# Patient Record
Sex: MALE | Race: WHITE | NOT HISPANIC OR LATINO | Employment: FULL TIME | URBAN - METROPOLITAN AREA
[De-identification: names, ages, dates, MRNs, and addresses within clinical notes are randomized per-mention and may not be internally consistent; named-entity substitution may affect disease eponyms.]

---

## 2020-03-01 ENCOUNTER — HOSPITAL ENCOUNTER (EMERGENCY)
Facility: HOSPITAL | Age: 60
Discharge: HOME | End: 2020-03-01
Attending: EMERGENCY MEDICINE
Payer: COMMERCIAL

## 2020-03-01 VITALS
OXYGEN SATURATION: 98 % | WEIGHT: 230 LBS | SYSTOLIC BLOOD PRESSURE: 178 MMHG | TEMPERATURE: 99 F | HEART RATE: 82 BPM | HEIGHT: 72 IN | RESPIRATION RATE: 20 BRPM | BODY MASS INDEX: 31.15 KG/M2 | DIASTOLIC BLOOD PRESSURE: 89 MMHG

## 2020-03-01 DIAGNOSIS — S00.83XA CONTUSION OF FOREHEAD, INITIAL ENCOUNTER: ICD-10-CM

## 2020-03-01 DIAGNOSIS — S29.8XXA BLUNT CHEST TRAUMA: ICD-10-CM

## 2020-03-01 DIAGNOSIS — S22.31XA CLOSED FRACTURE OF ONE RIB OF RIGHT SIDE, INITIAL ENCOUNTER: Primary | ICD-10-CM

## 2020-03-01 DIAGNOSIS — S52.126A CLOSED NONDISPLACED FRACTURE OF HEAD OF RADIUS, UNSPECIFIED LATERALITY, INITIAL ENCOUNTER: ICD-10-CM

## 2020-03-01 PROCEDURE — 29125 APPL SHORT ARM SPLINT STATIC: CPT | Mod: 50

## 2020-03-01 PROCEDURE — 25000003 PHARM REV CODE 250: Performed by: EMERGENCY MEDICINE

## 2020-03-01 PROCEDURE — 99285 EMERGENCY DEPT VISIT HI MDM: CPT | Mod: 25

## 2020-03-01 RX ORDER — HYDROCODONE BITARTRATE AND ACETAMINOPHEN 5; 325 MG/1; MG/1
1 TABLET ORAL
Status: COMPLETED | OUTPATIENT
Start: 2020-03-01 | End: 2020-03-01

## 2020-03-01 RX ORDER — HYDROCODONE BITARTRATE AND ACETAMINOPHEN 5; 325 MG/1; MG/1
1 TABLET ORAL EVERY 4 HOURS PRN
Qty: 20 TABLET | Refills: 0 | Status: SHIPPED | OUTPATIENT
Start: 2020-03-01 | End: 2020-03-03 | Stop reason: SDUPTHER

## 2020-03-01 RX ADMIN — HYDROCODONE BITARTRATE AND ACETAMINOPHEN 1 TABLET: 5; 325 TABLET ORAL at 10:03

## 2020-03-01 NOTE — ED NOTES
Adult Physical Assessment  LOC: Riana Beltran, 59 y.o. male verified via two identifiers.  The patient is awake, alert, oriented and speaking appropriately at this time.  APPEARANCE: Patient is clean and well groomed, patient's clothing is properly fastened.  SKIN:The skin is warm and dry, color consistent with ethnicity, patient has normal skin turgor and moist mucus membranes, pt has abrasions to RUQ of abd and an abrasion to his forehead.   MUSCULOSKELETAL: Patient moving all extremities, pt has pain to bilateral elbows and right rib area.  RESPIRATORY: Airway is open and patent, respirations are spontaneous, patient has a normal effort and rate, no accessory muscle use noted.  CARDIAC: Patient has a normal rate and rhythm, no periphreal edema noted in any extremity, capillary refill < 3 seconds in all extremities  ABDOMEN: Soft and non tender to palpation, no abdominal distention noted. Bowel sounds present in all four quadrants.  NEUROLOGIC: Eyes open spontaneously, behavior appropriate to situation, follows commands, facial expression symmetrical, bilateral hand grasp equal and even, purposeful motor response noted, normal sensation in all extremities when touched with a finger.

## 2020-03-01 NOTE — ED TRIAGE NOTES
Pt states he tripped and fell onto some brick steps and injured the right side of his ribs, both forearm areas, abrasion to his right knee area and hit his head and has some neck pain. Pt reports positive LOC.

## 2020-03-01 NOTE — ED PROVIDER NOTES
Encounter Date: 3/1/2020       History     Chief Complaint   Patient presents with    Fall     59-year-old male history of hypertension, obesity.  Patient presents to the emergency department status post mechanical trip and fall which occurred last night.  Patient states that he was walking attempting to walk down a flight of stairs and tripped and fell over his slipper falling forward approximately 5 ft down stairs striking his forehead and losing consciousness.  Patient states he decided not to come to the hospital last night because he thought he was going to be fine, however upon awakening this morning patient with right rib pain and bilateral elbow pain, and forehead pain.  Patient denies dizziness, blurred vision, nausea, difficulty with gait or speech. Patient does have multiple abrasions to the forehead, bilateral elbows, bilateral forearms and knees.        Review of patient's allergies indicates:  No Known Allergies  No past medical history on file.  No past surgical history on file.  No family history on file.  Social History     Tobacco Use    Smoking status: Not on file   Substance Use Topics    Alcohol use: Not on file    Drug use: Not on file     Review of Systems   Constitutional: Negative for fever.   HENT: Negative for congestion, ear pain, hearing loss, nosebleeds, rhinorrhea and sore throat.    Eyes: Negative for visual disturbance.   Respiratory: Negative for cough, choking, chest tightness, shortness of breath and wheezing.    Cardiovascular: Positive for chest pain (Right rib pain). Negative for palpitations.   Gastrointestinal: Negative for abdominal pain, diarrhea, nausea and vomiting.   Genitourinary: Negative for difficulty urinating, discharge, flank pain, penile pain and testicular pain.   Musculoskeletal: Positive for arthralgias (Bilateral elbow pain, forearm pain, wrist pain) and myalgias.   Skin: Negative for rash.        Abrasions noted to bilateral elbows, bilateral knees    Neurological: Negative for dizziness, seizures and headaches.   Hematological: Negative for adenopathy. Does not bruise/bleed easily.   Psychiatric/Behavioral: Negative for behavioral problems and confusion. The patient is not nervous/anxious.        Physical Exam     Initial Vitals [03/01/20 0943]   BP Pulse Resp Temp SpO2   (!) 163/97 83 16 98.3 °F (36.8 °C) (!) 94 %      MAP       --         Physical Exam    Nursing note and vitals reviewed.  Constitutional: He appears well-developed and well-nourished.  Non-toxic appearance. He does not have a sickly appearance. He does not appear ill. No distress.   HENT:   Head: Normocephalic and atraumatic. Head is without raccoon's eyes and without Andrade's sign.       Nose: Nose normal.   Mouth/Throat: Oropharynx is clear and moist.   Eyes: Conjunctivae and EOM are normal. Pupils are equal, round, and reactive to light. No scleral icterus.   Neck: Normal range of motion. Neck supple.   Cardiovascular: Normal rate, regular rhythm, normal heart sounds and intact distal pulses. Exam reveals no gallop and no friction rub.    No murmur heard.  Pulmonary/Chest: Breath sounds normal. No stridor. No respiratory distress.       Abdominal: Soft. Bowel sounds are normal. He exhibits no mass. There is no tenderness. There is no rebound and no guarding.   Musculoskeletal: He exhibits no edema.   Decreased range of motion at bilateral elbows with point tenderness at the radial head region   Lymphadenopathy:     He has no cervical adenopathy.   Neurological: He is alert and oriented to person, place, and time. He has normal strength and normal reflexes. No cranial nerve deficit or sensory deficit. GCS score is 15. GCS eye subscore is 4. GCS verbal subscore is 5. GCS motor subscore is 6.   Skin: Skin is warm and dry. Capillary refill takes less than 2 seconds. No rash noted.   Psychiatric: He has a normal mood and affect. His behavior is normal. Judgment and thought content normal.          ED Course   Procedures  Labs Reviewed - No data to display       Imaging Results          CT Cervical Spine Without Contrast (Final result)  Result time 03/01/20 11:34:01    Final result by Yunior Bernal MD (03/01/20 11:34:01)                 Impression:      1. No acute cervical fracture or subluxation.  Degenerative disc/facet disease as noted above.  .      Electronically signed by: Yunior Bernal MD  Date:    03/01/2020  Time:    11:34             Narrative:    EXAMINATION:  CT CERVICAL SPINE WITHOUT CONTRAST    CLINICAL HISTORY:  blunt head trauma;.    TECHNIQUE:  CMS MANDATED QUALITY DATA - CT RADIATION - 436    All CT scans at this facility utilize dose modulation, iterative reconstruction, and/or weight based dosing when appropriate to reduce radiation dose to as low as reasonably achievable.    Thin axial imaging was performed without contrast, with sagittal and coronal reformatted images reviewed.    COMPARISON:  None.    FINDINGS:  There is no CT evidence of acute cervical fracture or subluxation.  Prevertebral soft tissues are normal.  The odontoid is intact.    Changes of mild multilevel cervical degenerative disc disease are noted.  There is also mild multilevel degenerative facet hypertrophy.  Findings are most pronounced at C5-6, where uncinate hypertrophy results in moderate left and mild right foraminal stenosis.                               CT Head Without Contrast (Final result)  Result time 03/01/20 11:12:40    Final result by Yunior Bernal MD (03/01/20 11:12:40)                 Narrative:    CT HEAD WITHOUT CONTRAST    CMS MANDATED QUALITY DATA - CT RADIATION  436    All CT scans at this facility utilize dose modulation, iterative  reconstruction, and/or weight based dosing when appropriate to reduce  radiation dose to as low as reasonably achievable    Clinical data: Trauma.    FINDINGS: Noninfusion images were obtained from the skull base to the  vertex. There is no  intracranial mass, hemorrhage, or midline shift.  Ventricles and sulci are normal. There are no pathologic extra-axial  fluid collections. There is no evidence of ischemic change or edema.  Cerebellum and brainstem are normal.    The calvarium is intact.    IMPRESSION:    1. Normal CT head without contrast.      Electronically Signed by Meliton Bernal M.D. on 3/1/2020 11:21 AM                             X-Ray Wrist Complete Bilateral (Final result)  Result time 03/01/20 11:48:11    Final result by Yunior Bernal MD (03/01/20 11:48:11)                 Impression:      1. No acute radiographic abnormalities.      Electronically signed by: Yunior Bernal MD  Date:    03/01/2020  Time:    11:48             Narrative:    EXAMINATION:  XR WRIST COMPLETE 3 VIEWS BILATERAL    CLINICAL HISTORY:  Fall injury, bilateral wrist pain    COMPARISON:  None.    FINDINGS:  Four views of both wrists are submitted.    There is no evidence of fracture or dislocation.  No osseous destructive lesion or significant arthritic change is identified.  Soft tissues are unremarkable.                               X-Ray Ribs 2 View Right (Final result)  Result time 03/01/20 11:44:31    Final result by Yunior Bernal MD (03/01/20 11:44:31)                 Impression:      1. Age indeterminate nondisplaced fracture of the lateral aspect of the right 8th rib.  Correlate with any point tenderness at this level.  2. Chronic/healed fracture deformities of the right 6th and 9th ribs.      Electronically signed by: Yunior Bernal MD  Date:    03/01/2020  Time:    11:44             Narrative:    EXAMINATION:  XR RIBS 2 VIEW RIGHT    CLINICAL HISTORY:  .  Other specified injuries of thorax, initial encounter    COMPARISON:  None.    FINDINGS:  Four views are submitted.    Healed fracture deformity of the posterolateral right 9th rib is noted.  There is an age indeterminate nondisplaced fracture of the lateral right 8th rib, with a healed  fracture deformity noted of the lateral right 6th rib.  There is no pneumothorax or pleural effusion.                               X-Ray Forearm Bilateral (Final result)  Result time 03/01/20 11:17:13    Final result by Yunior Bernal MD (03/01/20 11:17:13)                 Impression:      1. Acute minimally displaced fracture of the right radial head with articular surface extension.  2. Probable nondisplaced fracture of the left radial neck.      Electronically signed by: Yunior Bernal MD  Date:    03/01/2020  Time:    11:17             Narrative:    EXAMINATION:  XR FOREARM BILATERAL    CLINICAL HISTORY:  Trauma    COMPARISON:  None.    FINDINGS:  Two views of both forearms are submitted.    On the right, there is an acute minimally displaced fracture of the radial head with articular surface involvement.  No other fractures are identified.  There is no dislocation.    On the left, seen only on a single view, there is a probable nondisplaced fracture of the proximal radial neck.  No other fractures are identified.  There is no dislocation.                               X-Ray Elbow Complete Bilateral (Final result)  Result time 03/01/20 11:28:00    Final result by Yunior Bernal MD (03/01/20 11:28:00)                 Impression:      1. Acute mildly depressed fracture of the right radial head.  2. Acute nondisplaced fracture of the left radial neck.  3. Mild bilateral anterior and posterior fat pad displacement consistent with hemarthrosis.      Electronically signed by: Yunior Bernal MD  Date:    03/01/2020  Time:    11:28             Narrative:    EXAMINATION:  XR ELBOW COMPLETE 3 VIEW BILATERAL    CLINICAL HISTORY:  Fall injury    COMPARISON:  None.    FINDINGS:  Four views of both elbows are submitted.    On the right, there is a mildly depressed fracture of the radial head, involving the articular surface.  No other fractures are identified.  There is no dislocation.  Anterior and posterior  fat pad displacement is consistent with mild hemarthrosis.    On the left, slight contour abnormality of the radial neck is consistent with acute nondisplaced fracture.  No other fractures are identified.  There is no dislocation.  Mild anterior and posterior fat pad displacement is consistent with hemarthrosis.                                 Medical Decision Making:   Initial Assessment:   59-year-old male here with complaint of blunt head blunt chest trauma after fall.  Differential Diagnosis:   Rib fracture, chest wall contusion, long bone fracture, closed head injury, concussion  Clinical Tests:   Radiological Study: Ordered and Reviewed  ED Management:  Patient in the emergency department found to have bilateral radial head fractures, patient also had 8 rib fracture which was acute, also had over rib fractures on the right side at the 6th and 9 th rib.  Patient had no pneumothorax.  CT head showed no evidence of intracranial pathology.  Patient instructed to follow up with Orthopedics next week.  Will be placed in posterior splint to both arms.  Given head injury instructions.  Patient to return if problems or additional concerns.                                 Clinical Impression:       ICD-10-CM ICD-9-CM   1. Closed fracture of one rib of right side, initial encounter S22.31XA 807.01   2. Blunt chest trauma S29.8XXA 959.11   3. Closed nondisplaced fracture of head of radius, unspecified laterality, initial encounter S52.126A 813.05   4. Contusion of forehead, initial encounter S00.83XA 920                                Govind Hicks MD  03/01/20 9822

## 2020-03-01 NOTE — ED NOTES
Splinting completed. Pt has positive PMS to bilateral upper ext. Pt is being taken home by his family member.

## 2020-03-03 ENCOUNTER — OFFICE VISIT (OUTPATIENT)
Dept: ORTHOPEDICS | Facility: CLINIC | Age: 60
End: 2020-03-03
Payer: COMMERCIAL

## 2020-03-03 ENCOUNTER — TELEPHONE (OUTPATIENT)
Dept: ORTHOPEDICS | Facility: CLINIC | Age: 60
End: 2020-03-03

## 2020-03-03 VITALS
HEIGHT: 72 IN | BODY MASS INDEX: 30.48 KG/M2 | HEART RATE: 84 BPM | WEIGHT: 225 LBS | SYSTOLIC BLOOD PRESSURE: 173 MMHG | DIASTOLIC BLOOD PRESSURE: 103 MMHG

## 2020-03-03 DIAGNOSIS — S52.135A CLOSED NONDISPLACED FRACTURE OF NECK OF LEFT RADIUS, INITIAL ENCOUNTER: ICD-10-CM

## 2020-03-03 DIAGNOSIS — S52.124A CLOSED NONDISPLACED FRACTURE OF HEAD OF RIGHT RADIUS, INITIAL ENCOUNTER: Primary | ICD-10-CM

## 2020-03-03 PROCEDURE — 99203 OFFICE O/P NEW LOW 30 MIN: CPT | Mod: 25,57,S$GLB, | Performed by: ORTHOPAEDIC SURGERY

## 2020-03-03 PROCEDURE — 99203 PR OFFICE/OUTPT VISIT, NEW, LEVL III, 30-44 MIN: ICD-10-PCS | Mod: 25,57,S$GLB, | Performed by: ORTHOPAEDIC SURGERY

## 2020-03-03 PROCEDURE — 3008F PR BODY MASS INDEX (BMI) DOCUMENTED: ICD-10-PCS | Mod: S$GLB,,, | Performed by: ORTHOPAEDIC SURGERY

## 2020-03-03 PROCEDURE — 3008F BODY MASS INDEX DOCD: CPT | Mod: S$GLB,,, | Performed by: ORTHOPAEDIC SURGERY

## 2020-03-03 RX ORDER — HYDROCODONE BITARTRATE AND ACETAMINOPHEN 5; 325 MG/1; MG/1
1 TABLET ORAL EVERY 6 HOURS PRN
Qty: 28 TABLET | Refills: 0 | Status: ON HOLD | OUTPATIENT
Start: 2020-03-03 | End: 2020-03-11 | Stop reason: HOSPADM

## 2020-03-03 NOTE — H&P (VIEW-ONLY)
[facility]    Subjective:     Chief Complaint:   Chief Complaint   Patient presents with    Left Elbow - Pain     Went to [facility] ER on 03/01/20, fall happened on 02/29/20, he fell of side of stairs, 8 feet, the stairs did not have railing, hit concrete floor    Right Elbow - Pain     Went to [facility] ER on 03/01/20, fall happened on 02/29/20, he fell of side of stairs, 8 feet, the stairs did not have railing, hit concrete floor       History reviewed. No pertinent past medical history.    Past Surgical History:   Procedure Laterality Date    ANKLE SURGERY Right     APPENDECTOMY      KNEE SURGERY Left     patella realignment    NECK SURGERY      laminectomy    OSTEOTOMY OF ZYGOMATIC BONE AND ORBIT         Current Outpatient Medications   Medication Sig    HYDROcodone-acetaminophen (NORCO) 5-325 mg per tablet Take 1 tablet by mouth every 4 (four) hours as needed for Pain.     No current facility-administered medications for this visit.        Review of patient's allergies indicates:  No Known Allergies    History reviewed. No pertinent family history.    Social History     Socioeconomic History    Marital status:      Spouse name: Not on file    Number of children: Not on file    Years of education: Not on file    Highest education level: Not on file   Occupational History    Not on file   Social Needs    Financial resource strain: Not on file    Food insecurity:     Worry: Not on file     Inability: Not on file    Transportation needs:     Medical: Not on file     Non-medical: Not on file   Tobacco Use    Smoking status: Current Every Day Smoker     Packs/day: 0.50     Years: 10.00     Pack years: 5.00    Smokeless tobacco: Never Used   Substance and Sexual Activity    Alcohol use: Yes    Drug use: Not on file    Sexual activity: Not on file   Lifestyle    Physical activity:     Days per week: Not on file     Minutes per session: Not on file    Stress: Not on file   Relationships     Social connections:     Talks on phone: Not on file     Gets together: Not on file     Attends Judaism service: Not on file     Active member of club or organization: Not on file     Attends meetings of clubs or organizations: Not on file     Relationship status: Not on file   Other Topics Concern    Not on file   Social History Narrative    Not on file       History of present illness:  Patient comes in today for the left elbow.  He is also here for the right elbow.  He fell about 8 ft sustained fractures to his bilateral radial head.  He was seen referred on for further care      Review of Systems:    Constitution: Negative for chills, fever, and sweats.  Negative for unexplained weight loss.    HENT:  Negative for headaches and blurry vision.    Cardiovascular:Negative for chest pain or irregular heart beat. Negative for hypertension.    Respiratory:  Negative for cough and shortness of breath.    Gastrointestinal: Negative for abdominal pain, heartburn, melena, nausea, and vomitting.    Genitourinary:  Negative bladder incontinence and dysuria.    Musculoskeletal:  See HPI for details.     Neurological: Negative for numbness.    Psychiatric/Behavioral: Negative for depression.  The patient is not nervous/anxious.      Endocrine: Negative for polyuria    Hematologic/Lymphatic: Negative for bleeding problem.  Does not bruise/bleed easily.    Skin: Negative for poor would healing and rash    Objective:      Physical Examination:    Vital Signs:    Vitals:    03/03/20 0952   BP: (!) 173/103   Pulse: 84       Body mass index is 30.52 kg/m².    This a well-developed, well nourished patient in no acute distress.  They are alert and oriented and cooperative to examination.        Patient has severe pain at the left elbow.  He has severe pain at the right elbow.  He is hesitant to move either elbow.  Pertinent New Results:    XRAY Report / Interpretation:   AP and lateral of the right elbow demonstrates displaced  radial head fracture.    AP and lateral the left elbow demonstrates a nondisplaced radial head fracture.    Assessment/Plan:      Bilateral radial head fractures.  The right elbow will need open reduction internal fixation.  Risks and benefits of this have discussed with him in great detail.  We will start him on early range of motion for the left elbow.  Surgery scheduled at his convenience.  He understands that full range of motion following surgical intervention of the elbow cannot be guaranteed and is unlikely.  All this is discussed with him in great detail      This note was created using Dragon voice recognition software that occasionally misinterpreted phrases or words.

## 2020-03-03 NOTE — PROGRESS NOTES
[facility]    Subjective:     Chief Complaint:   Chief Complaint   Patient presents with    Left Elbow - Pain     Went to [facility] ER on 03/01/20, fall happened on 02/29/20, he fell of side of stairs, 8 feet, the stairs did not have railing, hit concrete floor    Right Elbow - Pain     Went to [facility] ER on 03/01/20, fall happened on 02/29/20, he fell of side of stairs, 8 feet, the stairs did not have railing, hit concrete floor       History reviewed. No pertinent past medical history.    Past Surgical History:   Procedure Laterality Date    ANKLE SURGERY Right     APPENDECTOMY      KNEE SURGERY Left     patella realignment    NECK SURGERY      laminectomy    OSTEOTOMY OF ZYGOMATIC BONE AND ORBIT         Current Outpatient Medications   Medication Sig    HYDROcodone-acetaminophen (NORCO) 5-325 mg per tablet Take 1 tablet by mouth every 4 (four) hours as needed for Pain.     No current facility-administered medications for this visit.        Review of patient's allergies indicates:  No Known Allergies    History reviewed. No pertinent family history.    Social History     Socioeconomic History    Marital status:      Spouse name: Not on file    Number of children: Not on file    Years of education: Not on file    Highest education level: Not on file   Occupational History    Not on file   Social Needs    Financial resource strain: Not on file    Food insecurity:     Worry: Not on file     Inability: Not on file    Transportation needs:     Medical: Not on file     Non-medical: Not on file   Tobacco Use    Smoking status: Current Every Day Smoker     Packs/day: 0.50     Years: 10.00     Pack years: 5.00    Smokeless tobacco: Never Used   Substance and Sexual Activity    Alcohol use: Yes    Drug use: Not on file    Sexual activity: Not on file   Lifestyle    Physical activity:     Days per week: Not on file     Minutes per session: Not on file    Stress: Not on file   Relationships     Social connections:     Talks on phone: Not on file     Gets together: Not on file     Attends Pentecostal service: Not on file     Active member of club or organization: Not on file     Attends meetings of clubs or organizations: Not on file     Relationship status: Not on file   Other Topics Concern    Not on file   Social History Narrative    Not on file       History of present illness:  Patient comes in today for the left elbow.  He is also here for the right elbow.  He fell about 8 ft sustained fractures to his bilateral radial head.  He was seen referred on for further care      Review of Systems:    Constitution: Negative for chills, fever, and sweats.  Negative for unexplained weight loss.    HENT:  Negative for headaches and blurry vision.    Cardiovascular:Negative for chest pain or irregular heart beat. Negative for hypertension.    Respiratory:  Negative for cough and shortness of breath.    Gastrointestinal: Negative for abdominal pain, heartburn, melena, nausea, and vomitting.    Genitourinary:  Negative bladder incontinence and dysuria.    Musculoskeletal:  See HPI for details.     Neurological: Negative for numbness.    Psychiatric/Behavioral: Negative for depression.  The patient is not nervous/anxious.      Endocrine: Negative for polyuria    Hematologic/Lymphatic: Negative for bleeding problem.  Does not bruise/bleed easily.    Skin: Negative for poor would healing and rash    Objective:      Physical Examination:    Vital Signs:    Vitals:    03/03/20 0952   BP: (!) 173/103   Pulse: 84       Body mass index is 30.52 kg/m².    This a well-developed, well nourished patient in no acute distress.  They are alert and oriented and cooperative to examination.        Patient has severe pain at the left elbow.  He has severe pain at the right elbow.  He is hesitant to move either elbow.  Pertinent New Results:    XRAY Report / Interpretation:   AP and lateral of the right elbow demonstrates displaced  radial head fracture.    AP and lateral the left elbow demonstrates a nondisplaced radial head fracture.    Assessment/Plan:      Bilateral radial head fractures.  The right elbow will need open reduction internal fixation.  Risks and benefits of this have discussed with him in great detail.  We will start him on early range of motion for the left elbow.  Surgery scheduled at his convenience.  He understands that full range of motion following surgical intervention of the elbow cannot be guaranteed and is unlikely.  All this is discussed with him in great detail      This note was created using Dragon voice recognition software that occasionally misinterpreted phrases or words.

## 2020-03-04 ENCOUNTER — HOSPITAL ENCOUNTER (OUTPATIENT)
Dept: RADIOLOGY | Facility: HOSPITAL | Age: 60
Discharge: HOME | End: 2020-03-04
Attending: ORTHOPAEDIC SURGERY
Payer: COMMERCIAL

## 2020-03-04 ENCOUNTER — HOSPITAL ENCOUNTER (OUTPATIENT)
Dept: PREADMISSION TESTING | Facility: HOSPITAL | Age: 60
Discharge: HOME | End: 2020-03-04
Attending: ORTHOPAEDIC SURGERY
Payer: COMMERCIAL

## 2020-03-04 ENCOUNTER — LAB VISIT (OUTPATIENT)
Dept: LAB | Facility: HOSPITAL | Age: 60
End: 2020-03-04
Attending: ORTHOPAEDIC SURGERY
Payer: COMMERCIAL

## 2020-03-04 VITALS
HEART RATE: 82 BPM | DIASTOLIC BLOOD PRESSURE: 82 MMHG | BODY MASS INDEX: 32.85 KG/M2 | RESPIRATION RATE: 18 BRPM | SYSTOLIC BLOOD PRESSURE: 137 MMHG | WEIGHT: 242.5 LBS | HEIGHT: 72 IN | OXYGEN SATURATION: 99 % | TEMPERATURE: 99 F

## 2020-03-04 DIAGNOSIS — S52.124A CLOSED NONDISPLACED FRACTURE OF HEAD OF RIGHT RADIUS, INITIAL ENCOUNTER: ICD-10-CM

## 2020-03-04 DIAGNOSIS — Z01.818 PREOPERATIVE TESTING: Primary | ICD-10-CM

## 2020-03-04 DIAGNOSIS — S52.124A: ICD-10-CM

## 2020-03-04 DIAGNOSIS — S52.124A CLOSED NONDISPLACED FRACTURE OF HEAD OF RIGHT RADIUS, INITIAL ENCOUNTER: Primary | ICD-10-CM

## 2020-03-04 LAB
ALBUMIN SERPL BCP-MCNC: 4.4 G/DL (ref 3.5–5.2)
ALP SERPL-CCNC: 96 U/L (ref 55–135)
ALT SERPL W/O P-5'-P-CCNC: 69 U/L (ref 10–44)
ANION GAP SERPL CALC-SCNC: 10 MMOL/L (ref 8–16)
AST SERPL-CCNC: 63 U/L (ref 10–40)
BASOPHILS # BLD AUTO: 0.06 K/UL (ref 0–0.2)
BASOPHILS NFR BLD: 0.7 % (ref 0–1.9)
BILIRUB SERPL-MCNC: 1.1 MG/DL (ref 0.1–1)
BUN SERPL-MCNC: 10 MG/DL (ref 6–20)
CALCIUM SERPL-MCNC: 9.2 MG/DL (ref 8.7–10.5)
CHLORIDE SERPL-SCNC: 105 MMOL/L (ref 95–110)
CO2 SERPL-SCNC: 24 MMOL/L (ref 23–29)
CREAT SERPL-MCNC: 0.6 MG/DL (ref 0.5–1.4)
DIFFERENTIAL METHOD: ABNORMAL
EOSINOPHIL # BLD AUTO: 0.4 K/UL (ref 0–0.5)
EOSINOPHIL NFR BLD: 4.2 % (ref 0–8)
ERYTHROCYTE [DISTWIDTH] IN BLOOD BY AUTOMATED COUNT: 11.7 % (ref 11.5–14.5)
EST. GFR  (AFRICAN AMERICAN): >60 ML/MIN/1.73 M^2
EST. GFR  (NON AFRICAN AMERICAN): >60 ML/MIN/1.73 M^2
GLUCOSE SERPL-MCNC: 98 MG/DL (ref 70–110)
HCT VFR BLD AUTO: 45.3 % (ref 40–54)
HGB BLD-MCNC: 15.4 G/DL (ref 14–18)
IMM GRANULOCYTES # BLD AUTO: 0.02 K/UL (ref 0–0.04)
IMM GRANULOCYTES NFR BLD AUTO: 0.2 % (ref 0–0.5)
LYMPHOCYTES # BLD AUTO: 2.1 K/UL (ref 1–4.8)
LYMPHOCYTES NFR BLD: 22.6 % (ref 18–48)
MCH RBC QN AUTO: 31.4 PG (ref 27–31)
MCHC RBC AUTO-ENTMCNC: 34 G/DL (ref 32–36)
MCV RBC AUTO: 92 FL (ref 82–98)
MONOCYTES # BLD AUTO: 0.9 K/UL (ref 0.3–1)
MONOCYTES NFR BLD: 10.2 % (ref 4–15)
NEUTROPHILS # BLD AUTO: 5.7 K/UL (ref 1.8–7.7)
NEUTROPHILS NFR BLD: 62.1 % (ref 38–73)
NRBC BLD-RTO: 0 /100 WBC
PLATELET # BLD AUTO: 324 K/UL (ref 150–350)
PMV BLD AUTO: 10.9 FL (ref 9.2–12.9)
POTASSIUM SERPL-SCNC: 3.7 MMOL/L (ref 3.5–5.1)
PROT SERPL-MCNC: 7.7 G/DL (ref 6–8.4)
RBC # BLD AUTO: 4.91 M/UL (ref 4.6–6.2)
SODIUM SERPL-SCNC: 139 MMOL/L (ref 136–145)
WBC # BLD AUTO: 9.15 K/UL (ref 3.9–12.7)

## 2020-03-04 PROCEDURE — 36415 COLL VENOUS BLD VENIPUNCTURE: CPT

## 2020-03-04 PROCEDURE — 80053 COMPREHEN METABOLIC PANEL: CPT

## 2020-03-04 PROCEDURE — 85025 COMPLETE CBC W/AUTO DIFF WBC: CPT

## 2020-03-04 PROCEDURE — 93005 ELECTROCARDIOGRAM TRACING: CPT

## 2020-03-04 PROCEDURE — 71046 X-RAY EXAM CHEST 2 VIEWS: CPT | Mod: TC

## 2020-03-11 ENCOUNTER — HOSPITAL ENCOUNTER (OUTPATIENT)
Facility: HOSPITAL | Age: 60
Discharge: HOME | End: 2020-03-11
Attending: ORTHOPAEDIC SURGERY | Admitting: ORTHOPAEDIC SURGERY
Payer: COMMERCIAL

## 2020-03-11 ENCOUNTER — ANESTHESIA (OUTPATIENT)
Dept: SURGERY | Facility: HOSPITAL | Age: 60
End: 2020-03-11
Payer: COMMERCIAL

## 2020-03-11 ENCOUNTER — HOSPITAL ENCOUNTER (OUTPATIENT)
Dept: RADIOLOGY | Facility: HOSPITAL | Age: 60
Discharge: HOME | End: 2020-03-11
Attending: ORTHOPAEDIC SURGERY | Admitting: ORTHOPAEDIC SURGERY
Payer: COMMERCIAL

## 2020-03-11 ENCOUNTER — ANESTHESIA EVENT (OUTPATIENT)
Dept: SURGERY | Facility: HOSPITAL | Age: 60
End: 2020-03-11
Payer: COMMERCIAL

## 2020-03-11 VITALS
RESPIRATION RATE: 16 BRPM | BODY MASS INDEX: 32.78 KG/M2 | DIASTOLIC BLOOD PRESSURE: 92 MMHG | OXYGEN SATURATION: 93 % | SYSTOLIC BLOOD PRESSURE: 148 MMHG | HEIGHT: 72 IN | TEMPERATURE: 98 F | HEART RATE: 65 BPM | WEIGHT: 242 LBS

## 2020-03-11 DIAGNOSIS — S52.124A CLOSED NONDISPLACED FRACTURE OF HEAD OF RIGHT RADIUS, INITIAL ENCOUNTER: ICD-10-CM

## 2020-03-11 DIAGNOSIS — S52.124A: Primary | ICD-10-CM

## 2020-03-11 DIAGNOSIS — R52 PAIN: ICD-10-CM

## 2020-03-11 PROCEDURE — 27000673 HC TUBING BLOOD Y: Performed by: STUDENT IN AN ORGANIZED HEALTH CARE EDUCATION/TRAINING PROGRAM

## 2020-03-11 PROCEDURE — 37000008 HC ANESTHESIA 1ST 15 MINUTES: Performed by: ORTHOPAEDIC SURGERY

## 2020-03-11 PROCEDURE — 27000671 HC TUBING MICROBORE EXT: Performed by: STUDENT IN AN ORGANIZED HEALTH CARE EDUCATION/TRAINING PROGRAM

## 2020-03-11 PROCEDURE — C1769 GUIDE WIRE: HCPCS | Performed by: ORTHOPAEDIC SURGERY

## 2020-03-11 PROCEDURE — 25000003 PHARM REV CODE 250: Performed by: NURSE ANESTHETIST, CERTIFIED REGISTERED

## 2020-03-11 PROCEDURE — 76000 FLUOROSCOPY <1 HR PHYS/QHP: CPT | Mod: TC

## 2020-03-11 PROCEDURE — 36000710: Performed by: ORTHOPAEDIC SURGERY

## 2020-03-11 PROCEDURE — 36000711: Performed by: ORTHOPAEDIC SURGERY

## 2020-03-11 PROCEDURE — 25000003 PHARM REV CODE 250: Performed by: ANESTHESIOLOGY

## 2020-03-11 PROCEDURE — 27000284 HC CANNULA NASAL: Performed by: STUDENT IN AN ORGANIZED HEALTH CARE EDUCATION/TRAINING PROGRAM

## 2020-03-11 PROCEDURE — 27000650 HC AIRWAY EXCHANGE: Performed by: STUDENT IN AN ORGANIZED HEALTH CARE EDUCATION/TRAINING PROGRAM

## 2020-03-11 PROCEDURE — 27201423 OPTIME MED/SURG SUP & DEVICES STERILE SUPPLY: Performed by: ORTHOPAEDIC SURGERY

## 2020-03-11 PROCEDURE — 37000009 HC ANESTHESIA EA ADD 15 MINS: Performed by: ORTHOPAEDIC SURGERY

## 2020-03-11 PROCEDURE — 71000015 HC POSTOP RECOV 1ST HR: Performed by: ORTHOPAEDIC SURGERY

## 2020-03-11 PROCEDURE — 63600175 PHARM REV CODE 636 W HCPCS: Performed by: NURSE ANESTHETIST, CERTIFIED REGISTERED

## 2020-03-11 PROCEDURE — C1713 ANCHOR/SCREW BN/BN,TIS/BN: HCPCS | Performed by: ORTHOPAEDIC SURGERY

## 2020-03-11 PROCEDURE — 71000039 HC RECOVERY, EACH ADD'L HOUR: Performed by: ORTHOPAEDIC SURGERY

## 2020-03-11 PROCEDURE — 27000657 HC HEADREST, PRONE: Performed by: STUDENT IN AN ORGANIZED HEALTH CARE EDUCATION/TRAINING PROGRAM

## 2020-03-11 PROCEDURE — 71000033 HC RECOVERY, INTIAL HOUR: Performed by: ORTHOPAEDIC SURGERY

## 2020-03-11 PROCEDURE — 27000080 OPTIME MED/SURG SUP & DEVICES GENERAL CLASSIFICATION: Performed by: ORTHOPAEDIC SURGERY

## 2020-03-11 PROCEDURE — 27000653 HC CATH, IV CATHLN: Performed by: STUDENT IN AN ORGANIZED HEALTH CARE EDUCATION/TRAINING PROGRAM

## 2020-03-11 PROCEDURE — 63600175 PHARM REV CODE 636 W HCPCS: Performed by: ANESTHESIOLOGY

## 2020-03-11 PROCEDURE — 27201107 HC STYLET, STANDARD: Performed by: STUDENT IN AN ORGANIZED HEALTH CARE EDUCATION/TRAINING PROGRAM

## 2020-03-11 PROCEDURE — 27202105 HC BIS BILATERAL SENSOR: Performed by: STUDENT IN AN ORGANIZED HEALTH CARE EDUCATION/TRAINING PROGRAM

## 2020-03-11 RX ORDER — HYDROMORPHONE HYDROCHLORIDE 1 MG/ML
0.2 INJECTION, SOLUTION INTRAMUSCULAR; INTRAVENOUS; SUBCUTANEOUS
Status: COMPLETED | OUTPATIENT
Start: 2020-03-11 | End: 2020-03-11

## 2020-03-11 RX ORDER — SODIUM CHLORIDE 0.9 % (FLUSH) 0.9 %
10 SYRINGE (ML) INJECTION
Status: DISCONTINUED | OUTPATIENT
Start: 2020-03-11 | End: 2020-03-11 | Stop reason: HOSPADM

## 2020-03-11 RX ORDER — SODIUM CHLORIDE, SODIUM LACTATE, POTASSIUM CHLORIDE, CALCIUM CHLORIDE 600; 310; 30; 20 MG/100ML; MG/100ML; MG/100ML; MG/100ML
INJECTION, SOLUTION INTRAVENOUS CONTINUOUS PRN
Status: DISCONTINUED | OUTPATIENT
Start: 2020-03-11 | End: 2020-03-11

## 2020-03-11 RX ORDER — CEFAZOLIN SODIUM 2 G/50ML
2 SOLUTION INTRAVENOUS
Status: DISCONTINUED | OUTPATIENT
Start: 2020-03-11 | End: 2020-03-11 | Stop reason: HOSPADM

## 2020-03-11 RX ORDER — CELECOXIB 100 MG/1
200 CAPSULE ORAL ONCE
Status: COMPLETED | OUTPATIENT
Start: 2020-03-11 | End: 2020-03-11

## 2020-03-11 RX ORDER — NEOSTIGMINE METHYLSULFATE 1 MG/ML
INJECTION, SOLUTION INTRAVENOUS
Status: DISCONTINUED | OUTPATIENT
Start: 2020-03-11 | End: 2020-03-11

## 2020-03-11 RX ORDER — ROCURONIUM BROMIDE 10 MG/ML
INJECTION, SOLUTION INTRAVENOUS
Status: DISCONTINUED | OUTPATIENT
Start: 2020-03-11 | End: 2020-03-11

## 2020-03-11 RX ORDER — HYDROMORPHONE HYDROCHLORIDE 1 MG/ML
0.2 INJECTION, SOLUTION INTRAMUSCULAR; INTRAVENOUS; SUBCUTANEOUS
Status: DISCONTINUED | OUTPATIENT
Start: 2020-03-11 | End: 2020-03-11 | Stop reason: HOSPADM

## 2020-03-11 RX ORDER — ONDANSETRON 2 MG/ML
4 INJECTION INTRAMUSCULAR; INTRAVENOUS DAILY PRN
Status: DISCONTINUED | OUTPATIENT
Start: 2020-03-11 | End: 2020-03-11 | Stop reason: HOSPADM

## 2020-03-11 RX ORDER — SUCCINYLCHOLINE CHLORIDE 20 MG/ML
INJECTION INTRAMUSCULAR; INTRAVENOUS
Status: DISCONTINUED | OUTPATIENT
Start: 2020-03-11 | End: 2020-03-11

## 2020-03-11 RX ORDER — OXYCODONE HYDROCHLORIDE 5 MG/1
5 TABLET ORAL ONCE AS NEEDED
Status: DISCONTINUED | OUTPATIENT
Start: 2020-03-11 | End: 2020-03-11 | Stop reason: HOSPADM

## 2020-03-11 RX ORDER — DIPHENHYDRAMINE HYDROCHLORIDE 50 MG/ML
12.5 INJECTION INTRAMUSCULAR; INTRAVENOUS
Status: DISCONTINUED | OUTPATIENT
Start: 2020-03-11 | End: 2020-03-11 | Stop reason: HOSPADM

## 2020-03-11 RX ORDER — ONDANSETRON 2 MG/ML
INJECTION INTRAMUSCULAR; INTRAVENOUS
Status: DISCONTINUED | OUTPATIENT
Start: 2020-03-11 | End: 2020-03-11

## 2020-03-11 RX ORDER — GABAPENTIN 100 MG/1
100 CAPSULE ORAL ONCE
Status: COMPLETED | OUTPATIENT
Start: 2020-03-11 | End: 2020-03-11

## 2020-03-11 RX ORDER — FENTANYL CITRATE 50 UG/ML
INJECTION, SOLUTION INTRAMUSCULAR; INTRAVENOUS
Status: DISCONTINUED | OUTPATIENT
Start: 2020-03-11 | End: 2020-03-11

## 2020-03-11 RX ORDER — OXYCODONE AND ACETAMINOPHEN 7.5; 325 MG/1; MG/1
1 TABLET ORAL EVERY 4 HOURS PRN
Qty: 40 TABLET | Refills: 0 | Status: SHIPPED | OUTPATIENT
Start: 2020-03-11 | End: 2020-04-28

## 2020-03-11 RX ORDER — GLYCOPYRROLATE 0.2 MG/ML
INJECTION INTRAMUSCULAR; INTRAVENOUS
Status: DISCONTINUED | OUTPATIENT
Start: 2020-03-11 | End: 2020-03-11

## 2020-03-11 RX ORDER — MEPERIDINE HYDROCHLORIDE 50 MG/ML
12.5 INJECTION INTRAMUSCULAR; INTRAVENOUS; SUBCUTANEOUS EVERY 10 MIN PRN
Status: DISCONTINUED | OUTPATIENT
Start: 2020-03-11 | End: 2020-03-11 | Stop reason: HOSPADM

## 2020-03-11 RX ORDER — PROPOFOL 10 MG/ML
VIAL (ML) INTRAVENOUS
Status: DISCONTINUED | OUTPATIENT
Start: 2020-03-11 | End: 2020-03-11

## 2020-03-11 RX ORDER — OXYCODONE HYDROCHLORIDE 5 MG/1
5 TABLET ORAL
Status: DISCONTINUED | OUTPATIENT
Start: 2020-03-11 | End: 2020-03-11 | Stop reason: HOSPADM

## 2020-03-11 RX ORDER — LIDOCAINE HYDROCHLORIDE 20 MG/ML
INJECTION, SOLUTION EPIDURAL; INFILTRATION; INTRACAUDAL; PERINEURAL
Status: DISCONTINUED | OUTPATIENT
Start: 2020-03-11 | End: 2020-03-11

## 2020-03-11 RX ORDER — MIDAZOLAM HYDROCHLORIDE 1 MG/ML
INJECTION INTRAMUSCULAR; INTRAVENOUS
Status: DISCONTINUED | OUTPATIENT
Start: 2020-03-11 | End: 2020-03-11

## 2020-03-11 RX ADMIN — SODIUM CHLORIDE, SODIUM LACTATE, POTASSIUM CHLORIDE, AND CALCIUM CHLORIDE: .6; .31; .03; .02 INJECTION, SOLUTION INTRAVENOUS at 12:03

## 2020-03-11 RX ADMIN — LIDOCAINE HYDROCHLORIDE 100 MG: 20 INJECTION, SOLUTION EPIDURAL; INFILTRATION; INTRACAUDAL; PERINEURAL at 11:03

## 2020-03-11 RX ADMIN — HYDROMORPHONE HYDROCHLORIDE 0.2 MG: 1 INJECTION, SOLUTION INTRAMUSCULAR; INTRAVENOUS; SUBCUTANEOUS at 01:03

## 2020-03-11 RX ADMIN — GLYCOPYRROLATE 0.6 MG: 0.2 INJECTION INTRAMUSCULAR; INTRAVENOUS at 12:03

## 2020-03-11 RX ADMIN — NEOSTIGMINE METHYLSULFATE 5 MG: 1 INJECTION INTRAVENOUS at 12:03

## 2020-03-11 RX ADMIN — HYDROMORPHONE HYDROCHLORIDE 0.2 MG: 1 INJECTION, SOLUTION INTRAMUSCULAR; INTRAVENOUS; SUBCUTANEOUS at 12:03

## 2020-03-11 RX ADMIN — MIDAZOLAM HYDROCHLORIDE 2 MG: 1 INJECTION, SOLUTION INTRAMUSCULAR; INTRAVENOUS at 11:03

## 2020-03-11 RX ADMIN — PROPOFOL 50 MG: 10 INJECTION, EMULSION INTRAVENOUS at 11:03

## 2020-03-11 RX ADMIN — ROCURONIUM BROMIDE 5 MG: 10 INJECTION, SOLUTION INTRAVENOUS at 11:03

## 2020-03-11 RX ADMIN — ONDANSETRON 4 MG: 2 INJECTION INTRAMUSCULAR; INTRAVENOUS at 11:03

## 2020-03-11 RX ADMIN — FENTANYL CITRATE 100 MCG: 50 INJECTION INTRAMUSCULAR; INTRAVENOUS at 11:03

## 2020-03-11 RX ADMIN — SODIUM CHLORIDE, SODIUM LACTATE, POTASSIUM CHLORIDE, AND CALCIUM CHLORIDE: .6; .31; .03; .02 INJECTION, SOLUTION INTRAVENOUS at 11:03

## 2020-03-11 RX ADMIN — CELECOXIB 200 MG: 100 CAPSULE ORAL at 09:03

## 2020-03-11 RX ADMIN — OXYCODONE HYDROCHLORIDE 5 MG: 5 TABLET ORAL at 12:03

## 2020-03-11 RX ADMIN — GABAPENTIN 100 MG: 100 CAPSULE ORAL at 09:03

## 2020-03-11 RX ADMIN — FENTANYL CITRATE 100 MCG: 50 INJECTION INTRAMUSCULAR; INTRAVENOUS at 12:03

## 2020-03-11 RX ADMIN — ROCURONIUM BROMIDE 20 MG: 10 INJECTION, SOLUTION INTRAVENOUS at 11:03

## 2020-03-11 RX ADMIN — PROPOFOL 200 MG: 10 INJECTION, EMULSION INTRAVENOUS at 11:03

## 2020-03-11 RX ADMIN — SUCCINYLCHOLINE CHLORIDE 200 MG: 20 INJECTION, SOLUTION INTRAMUSCULAR; INTRAVENOUS at 11:03

## 2020-03-11 NOTE — TRANSFER OF CARE
Anesthesia Transfer of Care Note    Patient: Riana Beltran    Procedure(s) Performed: Procedure(s) (LRB):  ORIF, FRACTURE, RADIUS (ELBOW) (Right)  RELEASE, ELBOW, LATERAL EPICONDYLE (Right)    Patient location: PACU    Anesthesia Type: general    Transport from OR: Transported from OR on room air with adequate spontaneous ventilation    Post pain: adequate analgesia    Post assessment: no apparent anesthetic complications    Post vital signs: stable    Level of consciousness: awake and alert    Nausea/Vomiting: no nausea/vomiting    Complications: none    Transfer of care protocol was followed      Last vitals:   Visit Vitals  BP (!) 166/116 (BP Location: Left arm, Patient Position: Sitting)   Pulse 71   Temp 36.7 °C (98.1 °F)   Resp 16   Ht 6' (1.829 m)   Wt 109.8 kg (242 lb)   SpO2 (!) 94%   BMI 32.82 kg/m²

## 2020-03-11 NOTE — ANESTHESIA POSTPROCEDURE EVALUATION
Anesthesia Post Evaluation    Patient: Riana Beltran    Procedure(s) Performed: Procedure(s) (LRB):  ORIF, FRACTURE, RADIUS (ELBOW) (Right)  RELEASE, ELBOW, LATERAL EPICONDYLE (Right)    Final Anesthesia Type: general    Patient location during evaluation: PACU  Patient participation: Yes- Able to Participate  Level of consciousness: awake and alert and oriented  Post-procedure vital signs: reviewed and stable  Pain management: adequate  Airway patency: patent    PONV status at discharge: No PONV  Anesthetic complications: no      Cardiovascular status: blood pressure returned to baseline and hemodynamically stable  Respiratory status: unassisted, spontaneous ventilation and room air  Hydration status: euvolemic  Follow-up not needed.          Vitals Value Taken Time   /83 3/11/2020  1:30 PM   Temp 36.2 °C (97.1 °F) 3/11/2020  1:15 PM   Pulse 60 3/11/2020  1:39 PM   Resp 21 3/11/2020  1:39 PM   SpO2 94 % 3/11/2020  1:39 PM   Vitals shown include unvalidated device data.      No case tracking events are documented in the log.      Pain/Rocio Score: Pain Rating Prior to Med Admin: 6 (3/11/2020  1:26 PM)  Rocio Score: 10 (3/11/2020  1:15 PM)

## 2020-03-11 NOTE — PLAN OF CARE
Transported to Room 1551 in [facility] in stable condition.  Pain 6/10 and tolerable at this time.  Able to move fingers and has good capillary refill.  In [place] sling immobilizer with ice pack in use.  Report given to JASON White and then to JASON Chris.  Family at bedside

## 2020-03-11 NOTE — PLAN OF CARE
Arrived to PACU s/p right elbow ORIF and drainage of left elbow.  Dressing to both elbow D&I.  Has good capillary refill and able to wiggle fingers.  In extreme amount of pain.

## 2020-03-11 NOTE — ANESTHESIA PREPROCEDURE EVALUATION
03/11/2020  Riana Beltran is a 59 y.o., male.    Anesthesia Evaluation    I have reviewed the Patient Summary Reports.    I have reviewed the Nursing Notes.   I have reviewed the Medications.     Review of Systems  Anesthesia Hx:  Denies Family Hx of Anesthesia complications.   Denies Personal Hx of Anesthesia complications.   Social:  Smoker    Cardiovascular:   Hypertension (has been told many times that BP a little high but never treated)    Pulmonary:   Recent URI, resolved    Musculoskeletal:   Pt reports right rib fx due to trauma one week ago, but not mentioned on CXR report. Occ right mid thoracic pain with movement, but not with taking deep breaths.       Physical Exam  General:  Well nourished    Airway/Jaw/Neck:  Airway Findings: Mouth Opening: Normal Tongue: Normal  Mallampati: II  Improves to I with phonation.  TM Distance: Normal, at least 6 cm  Jaw/Neck Findings:  Neck ROM: Normal ROM      Dental:  Dental Findings: In tact   Chest/Lungs:  Chest/Lungs Findings: Clear to auscultation, Normal Respiratory Rate     Heart/Vascular:  Heart Findings: Rate: Normal  Rhythm: Regular Rhythm  Sounds: Normal  Heart murmur: negative       Mental Status:  Mental Status Findings:  Cooperative, Alert and Oriented         Anesthesia Plan  Type of Anesthesia, risks & benefits discussed:  Anesthesia Type:  general  Patient's Preference:   Intra-op Monitoring Plan: standard ASA monitors  Intra-op Monitoring Plan Comments:   Post Op Pain Control Plan: multimodal analgesia and IV/PO Opioids PRN  Post Op Pain Control Plan Comments:   Induction:   IV  Beta Blocker:  Patient is not currently on a Beta-Blocker (No further documentation required).       Informed Consent: Patient understands risks and agrees with Anesthesia plan.  Questions answered. Anesthesia consent signed with patient.  ASA Score: 3     Day of  Surgery Review of History & Physical:        Anesthesia Plan Notes: GETA vs LMA    Multimodal analgesia: Decadron 8mg.   Norco at 5am.  Celebrex 200mg and gabapentin 100mg given in holding area  Antiemetics: Zofran        Ready For Surgery From Anesthesia Perspective.

## 2020-03-11 NOTE — DISCHARGE INSTRUCTIONS
DISCHARGE INSTRUCTIONS  You may remove dressing from left elbow and replace with bandaid.  Do not submerge in pool of water    No driving, operating heavy machinery or signing legal documents x 24 hours  No drinking alcohol x 24 hours or while taking pain medication  You should not be driving while taking pain medication  Do not remove dressing  Keep in immobilizer sling until directed by md  Keep follow up appt    Discharge Instructions: After Your Surgery  Youve just had surgery. During surgery, you were given medicine called anesthesia to keep you relaxed and free of pain. After surgery, you may have some pain or nausea. This is common. Here are some tips for feeling better and getting well after surgery.     Stay on schedule with your medicine.   Going home  Your healthcare provider will show you how to take care of yourself when you go home. He or she will also answer your questions. Have an adult family member or friend drive you home. For the first 24 hours after your surgery:  · Do not drive or use heavy equipment.  · Do not make important decisions or sign legal papers.  · Do not drink alcohol.  · Have someone stay with you, if needed. He or she can watch for problems and help keep you safe.  Be sure to go to all follow-up visits with your healthcare provider. And rest after your surgery for as long as your healthcare provider tells you to.  Coping with pain  If you have pain after surgery, pain medicine will help you feel better. Take it as told, before pain becomes severe. Also, ask your healthcare provider or pharmacist about other ways to control pain. This might be with heat, ice, or relaxation. And follow any other instructions your surgeon or nurse gives you.  Tips for taking pain medicine  To get the best relief possible, remember these points:  · Pain medicines can upset your stomach. Taking them with a little food may help.  · Most pain relievers taken by mouth need at least 20 to 30 minutes to  start to work.  · Taking medicine on a schedule can help you remember to take it. Try to time your medicine so that you can take it before starting an activity. This might be before you get dressed, go for a walk, or sit down for dinner.  · Constipation is a common side effect of pain medicines. Call your healthcare provider before taking any medicines such as laxatives or stool softeners to help ease constipation. Also ask if you should skip any foods. Drinking lots of fluids and eating foods such as fruits and vegetables that are high in fiber can also help. Remember, do not take laxatives unless your surgeon has prescribed them.  · Drinking alcohol and taking pain medicine can cause dizziness and slow your breathing. It can even be deadly. Do not drink alcohol while taking pain medicine.  · Pain medicine can make you react more slowly to things. Do not drive or run machinery while taking pain medicine.  Your healthcare provider may tell you to take acetaminophen to help ease your pain. Ask him or her how much you are supposed to take each day. Acetaminophen or other pain relievers may interact with your prescription medicines or other over-the-counter (OTC) medicines. Some prescription medicines have acetaminophen and other ingredients. Using both prescription and OTC acetaminophen for pain can cause you to overdose. Read the labels on your OTC medicines with care. This will help you to clearly know the list of ingredients, how much to take, and any warnings. It may also help you not take too much acetaminophen. If you have questions or do not understand the information, ask your pharmacist or healthcare provider to explain it to you before you take the OTC medicine.  Managing nausea  Some people have an upset stomach after surgery. This is often because of anesthesia, pain, or pain medicine, or the stress of surgery. These tips will help you handle nausea and eat healthy foods as you get better. If you were on a  special food plan before surgery, ask your healthcare provider if you should follow it while you get better. These tips may help:  · Do not push yourself to eat. Your body will tell you when to eat and how much.  · Start off with clear liquids and soup. They are easier to digest.  · Next try semi-solid foods, such as mashed potatoes, applesauce, and gelatin, as you feel ready.  · Slowly move to solid foods. Dont eat fatty, rich, or spicy foods at first.  · Do not force yourself to have 3 large meals a day. Instead eat smaller amounts more often.  · Take pain medicines with a small amount of solid food, such as crackers or toast, to avoid nausea.     Call your surgeon if  · You still have pain an hour after taking medicine. The medicine may not be strong enough.  · You feel too sleepy, dizzy, or groggy. The medicine may be too strong.  · You have side effects like nausea, vomiting, or skin changes, such as rash, itching, or hives.       If you have obstructive sleep apnea  You were given anesthesia medicine during surgery to keep you comfortable and free of pain. After surgery, you may have more apnea spells because of this medicine and other medicines you were given. The spells may last longer than usual.   At home:  · Keep using the continuous positive airway pressure (CPAP) device when you sleep. Unless your healthcare provider tells you not to, use it when you sleep, day or night. CPAP is a common device used to treat obstructive sleep apnea.  · Talk with your provider before taking any pain medicine, muscle relaxants, or sedatives. Your provider will tell you about the possible dangers of taking these medicines.  Date Last Reviewed: 12/1/2016  © 7463-1434 [employer]. [address] [place] [address], [place] [address]. All rights reserved. This information is not intended as a substitute for professional medical care. Always follow your healthcare professional's instructions.          You should not be  running a temp greater than 101

## 2020-03-11 NOTE — OP NOTE
[facility]  Surgery Department  Operative Note    SUMMARY     Date of Procedure: 3/11/2020     Procedure:  Open reduction internal fixation of right radial head fracture    Surgeon(s) and Role:     * Saul Wynn MD - Primary    Assisting Surgeon: Christa    Pre-Operative Diagnosis: Closed nondisplaced fracture of head of right radius, initial encounter [S52.124A]    Post-Operative Diagnosis: Post-Op Diagnosis Codes:     * Closed nondisplaced fracture of head of right radius, initial encounter [S52.124A]    Anesthesia: General    Intraoperative Findings:  Displaced right intra-articular radial head fracture    Description of the Findings of the Procedure:  Patient was placed on the operating table in supine position.  The right upper extremity was prepped and draped in the usual sterile manner for surgery.  An incision was made directly over the radial head and carried down sharply through the skin.  The common extensor tendon was split.  The capsule was visualized and open.  There was a large effusion.  He was irrigated free.  Radial head fracture was visualized.  The fragment was elevated anatomically.  We now placed a guidewire across the fragment.  It was over drilled and then a compression screw was placed.  We obtained excellent compression.  We had an anatomic reduction.  We copiously irrigated.  We closed the extensor tendon with a running 0 Vicryl.  We closed the subcu with 2 0 Vicryl.  Skin was closed with 4-0 Monocryl.  The patient was noted to be in stable condition    Complications: No    Estimated Blood Loss (EBL): * No values recorded between 3/11/2020 11:51 AM and 3/11/2020 12:12 PM *           Implants:   Implant Name Type Inv. Item Serial No.  Lot No. LRB No. Used   LOG [id number] - Bottle MODULAR HAND SET - 1              Specimens:   Specimen (12h ago, onward)    None                  Condition: Good    Disposition: PACU - hemodynamically stable.              Discharge  Note    SUMMARY     Admit Date: 3/11/2020    Discharge Date and Time:  03/11/2020 12:12 PM    Hospital Course (synopsis of major diagnoses, care, treatment, and services provided during the course of the hospital stay): Uneventful      Final Diagnosis: Post-Op Diagnosis Codes:     * Closed nondisplaced fracture of head of right radius, initial encounter [S52.124A]    Disposition: Home or Self Care    Follow Up/Patient Instructions:     Medications:  Reconciled Home Medications:   Current Discharge Medication List      STOP taking these medications       HYDROcodone-acetaminophen (NORCO) 5-325 mg per tablet Comments:   Reason for Stopping:             No discharge procedures on file.

## 2020-03-11 NOTE — INTERVAL H&P NOTE
The patient has been examined and the H&P has been reviewed:    I concur with the findings and no changes have occurred since H&P was written.    Anesthesia/Surgery risks, benefits and alternative options discussed and understood by patient/family.          Active Hospital Problems    Diagnosis  POA    Traumatic closed nondisplaced fracture of head of radius, right, initial encounter [S57.058P]  Yes      Resolved Hospital Problems   No resolved problems to display.

## 2020-03-11 NOTE — PLAN OF CARE
Encouraging deep breaths and cough, even with broken ribs.  States he sometimes awakens and finds that he needs to remember to breath.  Discussed the possibility of testing for sleep apnea.

## 2020-03-24 ENCOUNTER — TELEPHONE (OUTPATIENT)
Dept: ORTHOPEDICS | Facility: CLINIC | Age: 60
End: 2020-03-24

## 2020-03-24 ENCOUNTER — OFFICE VISIT (OUTPATIENT)
Dept: ORTHOPEDICS | Facility: CLINIC | Age: 60
End: 2020-03-24
Payer: COMMERCIAL

## 2020-03-24 VITALS
HEART RATE: 91 BPM | WEIGHT: 241 LBS | BODY MASS INDEX: 32.64 KG/M2 | SYSTOLIC BLOOD PRESSURE: 131 MMHG | HEIGHT: 72 IN | DIASTOLIC BLOOD PRESSURE: 93 MMHG

## 2020-03-24 DIAGNOSIS — S52.124D: Primary | ICD-10-CM

## 2020-03-24 DIAGNOSIS — Z87.81 S/P ORIF (OPEN REDUCTION INTERNAL FIXATION) FRACTURE: ICD-10-CM

## 2020-03-24 DIAGNOSIS — Z98.890 S/P ORIF (OPEN REDUCTION INTERNAL FIXATION) FRACTURE: ICD-10-CM

## 2020-03-24 DIAGNOSIS — S52.135D CLOSED NONDISPLACED FRACTURE OF NECK OF LEFT RADIUS WITH ROUTINE HEALING, SUBSEQUENT ENCOUNTER: ICD-10-CM

## 2020-03-24 PROCEDURE — 99024 POSTOP FOLLOW-UP VISIT: CPT | Mod: S$GLB,,, | Performed by: ORTHOPAEDIC SURGERY

## 2020-03-24 PROCEDURE — 99024 PR POST-OP FOLLOW-UP VISIT: ICD-10-PCS | Mod: S$GLB,,, | Performed by: ORTHOPAEDIC SURGERY

## 2020-03-24 RX ORDER — HYDROCODONE BITARTRATE AND ACETAMINOPHEN 5; 325 MG/1; MG/1
1 TABLET ORAL EVERY 8 HOURS PRN
Qty: 28 TABLET | Refills: 0 | Status: SHIPPED | OUTPATIENT
Start: 2020-03-24 | End: 2020-03-24

## 2020-03-24 RX ORDER — HYDROCODONE BITARTRATE AND ACETAMINOPHEN 5; 325 MG/1; MG/1
1 TABLET ORAL EVERY 8 HOURS PRN
Qty: 28 TABLET | Refills: 0 | Status: SHIPPED | OUTPATIENT
Start: 2020-03-24 | End: 2020-03-31

## 2020-03-24 NOTE — TELEPHONE ENCOUNTER
----- Message from Brenda Thompson sent at 3/24/2020 12:33 PM CDT -----  Contact: Bette with [facility] pharmacy  Bette with [facility] pharmacy called regarding a prescription they received for patient, Bette said patient does not use their pharmacy, she uses [facility]

## 2020-03-24 NOTE — PROGRESS NOTES
[facility] ORTHOPEDICS POST-OP NOTE    Subjective:           Chief Complaint:   Chief Complaint   Patient presents with    Right Elbow - Post-op Evaluation     Open reduction internal fixation of right radial head fracture on 03/11/20       Past Medical History:   Diagnosis Date    Back pain 2020    Bilateral radial fractures 2020    Broken ribs 02/29/2020    Neck pain 2020       Past Surgical History:   Procedure Laterality Date    ANKLE SURGERY Right     APPENDECTOMY      KNEE SURGERY Left     patella realignment    LATERAL EPICONDYLE RELEASE Right 3/11/2020    Procedure: RELEASE, ELBOW, LATERAL EPICONDYLE;  Surgeon: Saul Wynn MD;  Location: [facility] OR;  Service: Orthopedics;  Laterality: Right;    NECK SURGERY      laminectomy    OPEN REDUCTION AND INTERNAL FIXATION (ORIF) OF FRACTURE OF RADIUS Right 3/11/2020    Procedure: ORIF, FRACTURE, RADIUS;  Surgeon: Saul Wynn MD;  Location: [facility];  Service: Orthopedics;  Laterality: Right;  Synthes, needs compression screws BENJAMIN SANTIAGO NOTIFIED    OSTEOTOMY OF ZYGOMATIC BONE AND ORBIT      TONSILLECTOMY         Current Outpatient Medications   Medication Sig    oxyCODONE-acetaminophen (PERCOCET) 7.5-325 mg per tablet Take 1 tablet by mouth every 4 (four) hours as needed for Pain.     No current facility-administered medications for this visit.        Review of patient's allergies indicates:  No Known Allergies    History reviewed. No pertinent family history.    Social History     Socioeconomic History    Marital status:      Spouse name: Not on file    Number of children: Not on file    Years of education: Not on file    Highest education level: Not on file   Occupational History    Not on file   Social Needs    Financial resource strain: Not on file    Food insecurity:     Worry: Not on file     Inability: Not on file    Transportation needs:     Medical: Not on file     Non-medical: Not on file   Tobacco Use    Smoking status:  Current Every Day Smoker     Packs/day: 0.50     Years: 10.00     Pack years: 5.00    Smokeless tobacco: Never Used   Substance and Sexual Activity    Alcohol use: Yes    Drug use: Not on file    Sexual activity: Not on file   Lifestyle    Physical activity:     Days per week: Not on file     Minutes per session: Not on file    Stress: Not on file   Relationships    Social connections:     Talks on phone: Not on file     Gets together: Not on file     Attends Latter day service: Not on file     Active member of club or organization: Not on file     Attends meetings of clubs or organizations: Not on file     Relationship status: Not on file   Other Topics Concern    Not on file   Social History Narrative    Not on file       History of present illness:  Patient is approximately 3 weeks status post fall where he suffered bilateral radial head fractures.  He is approximately 2 weeks status post ORIF of the right radial head fracture.  And is still wearing a sling on his left elbow.  Overall he is doing okay, he complains of pain mostly at night as he is uncomfortable and has difficulty moving or turning secondary to pain in both of his elbows.  He also reports having to rib fractures.      Review of Systems:    Musculoskeletal:  See HPI      Objective:        Physical Examination:    Vital Signs:    Vitals:    03/24/20 1017   BP: (!) 131/93   Pulse: 91       Body mass index is 32.69 kg/m².    This a well-developed, well nourished patient in no acute distress.  They are alert and oriented and cooperative to examination.        Examination of the bilateral elbows, the right elbow demonstrates a well-healed surgical incision consistent with his recent surgical procedure.  He has good extension and minimal pain with pronation and supination.  The left elbow demonstrates some tenderness over the radial head, again he has good extension and minimal pain with pronation and supination.  Pertinent New Results:    XRAY  Report / Interpretation:   AP and lateral views of the bilateral elbows completed today in the office demonstrate good anatomic alignment of the right elbow and the left elbow radial head appears nondisplaced.    Assessment/Plan:      The patient is doing well status post traumatic injury and fall with bilateral radial head fractures.  The surgical procedure on the right elbow appears to be well healed.  The patient has good range of motion or improving range of motion as well as good pronation and supination.  Similar with the left.  For his discomfort at night, we will refill his hydrocodone.  He can continue to take anti-inflammatories during the day.  Instructed on rest, as well as ice as needed for discomfort.    We will see him back in approximately 4 weeks.    This note was created using Dragon voice recognition software that occasionally misinterpreted phrases or words.

## 2020-03-24 NOTE — TELEPHONE ENCOUNTER
Called and spoke with the pt, he is aware his prescription is at [facility] and he will pick it up there

## 2020-04-28 ENCOUNTER — OFFICE VISIT (OUTPATIENT)
Dept: ORTHOPEDICS | Facility: CLINIC | Age: 60
End: 2020-04-28
Payer: COMMERCIAL

## 2020-04-28 VITALS — BODY MASS INDEX: 32.28 KG/M2 | SYSTOLIC BLOOD PRESSURE: 160 MMHG | DIASTOLIC BLOOD PRESSURE: 90 MMHG | WEIGHT: 238 LBS

## 2020-04-28 DIAGNOSIS — Z87.81 S/P ORIF (OPEN REDUCTION INTERNAL FIXATION) FRACTURE: Primary | ICD-10-CM

## 2020-04-28 DIAGNOSIS — Z98.890 S/P ORIF (OPEN REDUCTION INTERNAL FIXATION) FRACTURE: Primary | ICD-10-CM

## 2020-04-28 DIAGNOSIS — S52.124D: ICD-10-CM

## 2020-04-28 PROCEDURE — 99024 PR POST-OP FOLLOW-UP VISIT: ICD-10-PCS | Mod: S$GLB,,, | Performed by: ORTHOPAEDIC SURGERY

## 2020-04-28 PROCEDURE — 99024 POSTOP FOLLOW-UP VISIT: CPT | Mod: S$GLB,,, | Performed by: ORTHOPAEDIC SURGERY

## 2020-04-28 RX ORDER — IBUPROFEN 200 MG
200 TABLET ORAL EVERY 6 HOURS PRN
COMMUNITY

## 2020-04-28 NOTE — PROGRESS NOTES
[facility] ORTHOPEDICS POST-OP NOTE    Subjective:           Chief Complaint:   Chief Complaint   Patient presents with    Right Elbow - Post-op Evaluation     ORIF RT Radial head 3/11/2020. He has some minor pain when he tries to straighten it       Past Medical History:   Diagnosis Date    Back pain 2020    Bilateral radial fractures 2020    Broken ribs 02/29/2020    Neck pain 2020       Past Surgical History:   Procedure Laterality Date    ANKLE SURGERY Right     APPENDECTOMY      KNEE SURGERY Left     patella realignment    LATERAL EPICONDYLE RELEASE Right 3/11/2020    Procedure: RELEASE, ELBOW, LATERAL EPICONDYLE;  Surgeon: Saul Wynn MD;  Location: [facility];  Service: Orthopedics;  Laterality: Right;    NECK SURGERY      laminectomy    OPEN REDUCTION AND INTERNAL FIXATION (ORIF) OF FRACTURE OF RADIUS Right 3/11/2020    Procedure: ORIF, FRACTURE, RADIUS;  Surgeon: Saul Wynn MD;  Location: [facility];  Service: Orthopedics;  Laterality: Right;  Synthes, needs compression screws BENJAMIN SANTIAGO NOTIFIED    OSTEOTOMY OF ZYGOMATIC BONE AND ORBIT      TONSILLECTOMY         Current Outpatient Medications   Medication Sig    ibuprofen (ADVIL,MOTRIN) 200 MG tablet Take 200 mg by mouth every 6 (six) hours as needed for Pain.     No current facility-administered medications for this visit.        Review of patient's allergies indicates:  No Known Allergies    History reviewed. No pertinent family history.    Social History     Socioeconomic History    Marital status:      Spouse name: Not on file    Number of children: Not on file    Years of education: Not on file    Highest education level: Not on file   Occupational History    Not on file   Social Needs    Financial resource strain: Not on file    Food insecurity:     Worry: Not on file     Inability: Not on file    Transportation needs:     Medical: Not on file     Non-medical: Not on file   Tobacco Use    Smoking status: Current Every Day  Smoker     Packs/day: 0.50     Years: 10.00     Pack years: 5.00    Smokeless tobacco: Never Used   Substance and Sexual Activity    Alcohol use: Yes    Drug use: Not on file    Sexual activity: Not on file   Lifestyle    Physical activity:     Days per week: Not on file     Minutes per session: Not on file    Stress: Not on file   Relationships    Social connections:     Talks on phone: Not on file     Gets together: Not on file     Attends Yarsani service: Not on file     Active member of club or organization: Not on file     Attends meetings of clubs or organizations: Not on file     Relationship status: Not on file   Other Topics Concern    Not on file   Social History Narrative    Not on file       History of present illness:  Patient returns for the l right elbow.  He is doing quite well.      Review of Systems:    Musculoskeletal:  See HPI      Objective:        Physical Examination:    Vital Signs:    Vitals:    04/28/20 1040   BP: (!) 160/90       Body mass index is 32.28 kg/m².    This a well-developed, well nourished patient in no acute distress.  They are alert and oriented and cooperative to examination.        Range of motion is .  Range of motion in terms of pronation and supination is full  Pertinent New Results:    XRAY Report / Interpretation:   AP and lateral of the right elbow demonstrates his hardware to be in ideal position.  The fracture is healed.    Assessment/Plan:      Stable following open reduction internal fixation of the right elbow.  Continue range of motion exercises.  Start formal physical therapy to obtain the last amount of extension and flexion.  Follow-up in 1 month    This note was created using Dragon voice recognition software that occasionally misinterpreted phrases or words.

## 2022-08-24 ENCOUNTER — OFFICE VISIT (OUTPATIENT)
Dept: CARDIOLOGY | Facility: CLINIC | Age: 62
End: 2022-08-24
Payer: COMMERCIAL

## 2022-08-24 VITALS
DIASTOLIC BLOOD PRESSURE: 76 MMHG | HEIGHT: 72 IN | RESPIRATION RATE: 16 BRPM | WEIGHT: 239 LBS | SYSTOLIC BLOOD PRESSURE: 122 MMHG | OXYGEN SATURATION: 97 % | HEART RATE: 75 BPM | BODY MASS INDEX: 32.37 KG/M2

## 2022-08-24 DIAGNOSIS — I10 ESSENTIAL HYPERTENSION: ICD-10-CM

## 2022-08-24 DIAGNOSIS — I50.20 SYSTOLIC CONGESTIVE HEART FAILURE, UNSPECIFIED HF CHRONICITY: ICD-10-CM

## 2022-08-24 DIAGNOSIS — I51.9 LV DYSFUNCTION: Primary | ICD-10-CM

## 2022-08-24 DIAGNOSIS — R94.31 NONSPECIFIC ABNORMAL ELECTROCARDIOGRAM (ECG) (EKG): ICD-10-CM

## 2022-08-24 DIAGNOSIS — R06.02 SHORTNESS OF BREATH: ICD-10-CM

## 2022-08-24 DIAGNOSIS — I48.0 PAROXYSMAL ATRIAL FIBRILLATION: ICD-10-CM

## 2022-08-24 PROCEDURE — 1159F MED LIST DOCD IN RCRD: CPT | Mod: CPTII,S$GLB,, | Performed by: INTERNAL MEDICINE

## 2022-08-24 PROCEDURE — 1159F PR MEDICATION LIST DOCUMENTED IN MEDICAL RECORD: ICD-10-PCS | Mod: CPTII,S$GLB,, | Performed by: INTERNAL MEDICINE

## 2022-08-24 PROCEDURE — 1160F RVW MEDS BY RX/DR IN RCRD: CPT | Mod: CPTII,S$GLB,, | Performed by: INTERNAL MEDICINE

## 2022-08-24 PROCEDURE — 93000 ELECTROCARDIOGRAM COMPLETE: CPT | Mod: S$GLB,,, | Performed by: INTERNAL MEDICINE

## 2022-08-24 PROCEDURE — 3074F PR MOST RECENT SYSTOLIC BLOOD PRESSURE < 130 MM HG: ICD-10-PCS | Mod: CPTII,S$GLB,, | Performed by: INTERNAL MEDICINE

## 2022-08-24 PROCEDURE — 99205 PR OFFICE/OUTPT VISIT, NEW, LEVL V, 60-74 MIN: ICD-10-PCS | Mod: S$GLB,,, | Performed by: INTERNAL MEDICINE

## 2022-08-24 PROCEDURE — 3078F PR MOST RECENT DIASTOLIC BLOOD PRESSURE < 80 MM HG: ICD-10-PCS | Mod: CPTII,S$GLB,, | Performed by: INTERNAL MEDICINE

## 2022-08-24 PROCEDURE — 1160F PR REVIEW ALL MEDS BY PRESCRIBER/CLIN PHARMACIST DOCUMENTED: ICD-10-PCS | Mod: CPTII,S$GLB,, | Performed by: INTERNAL MEDICINE

## 2022-08-24 PROCEDURE — 4010F ACE/ARB THERAPY RXD/TAKEN: CPT | Mod: CPTII,S$GLB,, | Performed by: INTERNAL MEDICINE

## 2022-08-24 PROCEDURE — 99205 OFFICE O/P NEW HI 60 MIN: CPT | Mod: S$GLB,,, | Performed by: INTERNAL MEDICINE

## 2022-08-24 PROCEDURE — 3078F DIAST BP <80 MM HG: CPT | Mod: CPTII,S$GLB,, | Performed by: INTERNAL MEDICINE

## 2022-08-24 PROCEDURE — 3008F BODY MASS INDEX DOCD: CPT | Mod: CPTII,S$GLB,, | Performed by: INTERNAL MEDICINE

## 2022-08-24 PROCEDURE — 93000 EKG 12-LEAD: ICD-10-PCS | Mod: S$GLB,,, | Performed by: INTERNAL MEDICINE

## 2022-08-24 PROCEDURE — 3008F PR BODY MASS INDEX (BMI) DOCUMENTED: ICD-10-PCS | Mod: CPTII,S$GLB,, | Performed by: INTERNAL MEDICINE

## 2022-08-24 PROCEDURE — 4010F PR ACE/ARB THEARPY RXD/TAKEN: ICD-10-PCS | Mod: CPTII,S$GLB,, | Performed by: INTERNAL MEDICINE

## 2022-08-24 PROCEDURE — 3074F SYST BP LT 130 MM HG: CPT | Mod: CPTII,S$GLB,, | Performed by: INTERNAL MEDICINE

## 2022-08-24 RX ORDER — CARVEDILOL 6.25 MG/1
6.25 TABLET ORAL 2 TIMES DAILY
Qty: 180 TABLET | Refills: 3 | Status: SHIPPED | OUTPATIENT
Start: 2022-08-24 | End: 2022-09-21 | Stop reason: SDUPTHER

## 2022-08-24 RX ORDER — FUROSEMIDE 20 MG/1
20 TABLET ORAL EVERY OTHER DAY
Qty: 45 TABLET | Refills: 3 | Status: SHIPPED | OUTPATIENT
Start: 2022-08-24 | End: 2022-11-17 | Stop reason: SDUPTHER

## 2022-08-24 RX ORDER — LISINOPRIL 10 MG/1
10 TABLET ORAL DAILY
COMMUNITY
Start: 2022-08-21 | End: 2022-08-24 | Stop reason: SDUPTHER

## 2022-08-24 RX ORDER — APIXABAN 5 MG/1
5 TABLET, FILM COATED ORAL 2 TIMES DAILY
COMMUNITY
Start: 2022-08-21 | End: 2022-08-24 | Stop reason: SDUPTHER

## 2022-08-24 RX ORDER — CARVEDILOL 6.25 MG/1
6.25 TABLET ORAL 2 TIMES DAILY
COMMUNITY
Start: 2022-08-21 | End: 2022-08-24 | Stop reason: SDUPTHER

## 2022-08-24 RX ORDER — LISINOPRIL 10 MG/1
10 TABLET ORAL DAILY
Qty: 90 TABLET | Refills: 3 | Status: SHIPPED | OUTPATIENT
Start: 2022-08-24 | End: 2022-09-20 | Stop reason: SDUPTHER

## 2022-08-24 RX ORDER — SPIRONOLACTONE 25 MG/1
25 TABLET ORAL DAILY
Qty: 90 TABLET | Refills: 3 | Status: SHIPPED | OUTPATIENT
Start: 2022-08-24 | End: 2022-11-17 | Stop reason: SDUPTHER

## 2022-08-24 RX ORDER — LANOLIN ALCOHOL/MO/W.PET/CERES
400 CREAM (GRAM) TOPICAL DAILY
Qty: 90 TABLET | Refills: 3 | Status: SHIPPED | OUTPATIENT
Start: 2022-08-24 | End: 2022-12-27 | Stop reason: SDUPTHER

## 2022-08-24 NOTE — ASSESSMENT & PLAN NOTE
Patient is identified as having Systolic (HFrEF) heart failure that is Acute on chronic. CHF is currently controlled. Latest ECHO performed and demonstrates- No results found for this or any previous visit.  . Continue Beta Blocker, ACE/ARB, Furosemide and Aldactone and monitor clinical status closely. Monitor on telemetry. Patient is on CHF pathway.  Monitor strict Is&Os and daily weights.  Place on fluid restriction of 2 L. Continue to stress to patient importance of self efficacy and  on diet for CHF.

## 2022-08-24 NOTE — ASSESSMENT & PLAN NOTE
Progressive shortness of breath this appears to multifactorial currently he is in sinus rhythm.  And rate controlled fairly well.  He will need further workup for evidence of any ischemia manifesting his symptoms as well     Patient has multiple risk factors for shortness of breath including tobacco usage LV dysfunction jiménez congestive heart failure as well.  I have encouraged him to obtain arm chemistry to include a BNP and BMP  May consider initiating on Lasix 20 mg daily along with potassium 10 mg a day.
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
## 2022-08-24 NOTE — ASSESSMENT & PLAN NOTE
Patient will need arm evaluation for coronary insufficiency may recommend Lexiscan Myoview to initiate this therapy.  I suspect he will need angiographic assessment final diagnosis.  I will see him in the office after initial testing is completed

## 2022-08-25 ENCOUNTER — PATIENT MESSAGE (OUTPATIENT)
Dept: CARDIOLOGY | Facility: CLINIC | Age: 62
End: 2022-08-25
Payer: COMMERCIAL

## 2022-08-26 ENCOUNTER — TELEPHONE (OUTPATIENT)
Dept: CARDIOLOGY | Facility: HOSPITAL | Age: 62
End: 2022-08-26

## 2022-08-26 NOTE — TELEPHONE ENCOUNTER
NUCLEAR LEXISCAN TEST  Patient advised, test will be at [facility] ([address]   Will need to register on the first floor at the main entrance.   Patient advised that arrival time is 0700.  Patient advised that he may be here about 3.5-4 hours, and may want to bring something to occupy their time, as there will be periods of waiting.    Patient advised, may take his medications prior to testing if you need to. Advised if he needs to eat to take his medications, please keep it light, like toast and juice.    Patient advised to avoid all caffeine 12 hours prior to testing.  This includes decaf tea and coffee.    Wear comfortable clothing.   No lotions, oils, or powders to the upper chest area. May wear deodorant.    No metal jewelry, buttons, or zippers to the upper body.  Patient verbalizes understanding of instructions.

## 2022-08-29 ENCOUNTER — HOSPITAL ENCOUNTER (OUTPATIENT)
Dept: RADIOLOGY | Facility: HOSPITAL | Age: 62
Discharge: HOME | End: 2022-08-29
Attending: INTERNAL MEDICINE
Payer: COMMERCIAL

## 2022-08-29 DIAGNOSIS — I48.0 PAROXYSMAL ATRIAL FIBRILLATION: ICD-10-CM

## 2022-08-29 DIAGNOSIS — I10 ESSENTIAL HYPERTENSION: ICD-10-CM

## 2022-08-29 DIAGNOSIS — I51.9 LV DYSFUNCTION: ICD-10-CM

## 2022-08-29 DIAGNOSIS — R06.02 SHORTNESS OF BREATH: ICD-10-CM

## 2022-08-30 ENCOUNTER — HOSPITAL ENCOUNTER (OUTPATIENT)
Dept: CARDIOLOGY | Facility: HOSPITAL | Age: 62
Discharge: HOME | End: 2022-08-30
Attending: INTERNAL MEDICINE
Payer: COMMERCIAL

## 2022-08-30 ENCOUNTER — HOSPITAL ENCOUNTER (OUTPATIENT)
Dept: RADIOLOGY | Facility: HOSPITAL | Age: 62
Discharge: HOME | End: 2022-08-30
Attending: INTERNAL MEDICINE
Payer: COMMERCIAL

## 2022-08-30 DIAGNOSIS — I10 ESSENTIAL HYPERTENSION: ICD-10-CM

## 2022-08-30 DIAGNOSIS — I51.9 LV DYSFUNCTION: Primary | ICD-10-CM

## 2022-08-30 DIAGNOSIS — I48.0 PAROXYSMAL ATRIAL FIBRILLATION: ICD-10-CM

## 2022-08-30 DIAGNOSIS — R06.02 SHORTNESS OF BREATH: ICD-10-CM

## 2022-08-30 LAB
CV PHARM DOSE: 0.4 MG
CV STRESS BASE HR: 68 BPM
DIASTOLIC BLOOD PRESSURE: 84 MMHG
EJECTION FRACTION- HIGH: 59 %
END DIASTOLIC INDEX-HIGH: 155 ML/M2
END DIASTOLIC INDEX-LOW: 91 ML/M2
END SYSTOLIC INDEX-HIGH: 78 ML/M2
END SYSTOLIC INDEX-LOW: 40 ML/M2
NUC STRESS DIASTOLIC VOLUME INDEX: 280
NUC STRESS EJECTION FRACTION: 25 %
NUC STRESS SYSTOLIC VOLUME INDEX: 209
OHS CV CPX 1 MINUTE RECOVERY HEART RATE: 75 BPM
OHS CV CPX 85 PERCENT MAX PREDICTED HEART RATE MALE: 136
OHS CV CPX MAX PREDICTED HEART RATE: 160
OHS CV CPX PATIENT IS FEMALE: 0
OHS CV CPX PATIENT IS MALE: 1
OHS CV CPX PEAK DIASTOLIC BLOOD PRESSURE: 84 MMHG
OHS CV CPX PEAK HEAR RATE: 75 BPM
OHS CV CPX PEAK RATE PRESSURE PRODUCT: 9000
OHS CV CPX PEAK SYSTOLIC BLOOD PRESSURE: 120 MMHG
OHS CV CPX PERCENT MAX PREDICTED HEART RATE ACHIEVED: 47
OHS CV CPX RATE PRESSURE PRODUCT PRESENTING: 8160
RETIRED EF AND QEF - SEE NOTES: 47 %
SYSTOLIC BLOOD PRESSURE: 120 MMHG

## 2022-08-30 PROCEDURE — 93016 CV STRESS TEST SUPVJ ONLY: CPT | Mod: ,,,

## 2022-08-30 PROCEDURE — 93018 STRESS TEST WITH MYOCARDIAL PERFUSION (CUPID ONLY): ICD-10-PCS | Mod: ,,, | Performed by: INTERNAL MEDICINE

## 2022-08-30 PROCEDURE — 93016 PR CARDIAC STRESS TST,DR SUPERV ONLY: ICD-10-PCS | Mod: ,,,

## 2022-08-30 PROCEDURE — 78452 HT MUSCLE IMAGE SPECT MULT: CPT | Mod: 26,,, | Performed by: INTERNAL MEDICINE

## 2022-08-30 PROCEDURE — 78452 STRESS TEST WITH MYOCARDIAL PERFUSION (CUPID ONLY): ICD-10-PCS | Mod: 26,,, | Performed by: INTERNAL MEDICINE

## 2022-08-30 PROCEDURE — 93018 CV STRESS TEST I&R ONLY: CPT | Mod: ,,, | Performed by: INTERNAL MEDICINE

## 2022-08-30 PROCEDURE — 78452 HT MUSCLE IMAGE SPECT MULT: CPT

## 2022-08-30 RX ORDER — REGADENOSON 0.08 MG/ML
0.4 INJECTION, SOLUTION INTRAVENOUS ONCE
Status: COMPLETED | OUTPATIENT
Start: 2022-08-30 | End: 2022-08-30

## 2022-08-30 RX ADMIN — REGADENOSON 0.4 MG: 0.08 INJECTION, SOLUTION INTRAVENOUS at 08:08

## 2022-09-01 ENCOUNTER — OFFICE VISIT (OUTPATIENT)
Dept: CARDIOLOGY | Facility: CLINIC | Age: 62
End: 2022-09-01
Payer: COMMERCIAL

## 2022-09-01 VITALS
HEIGHT: 72 IN | OXYGEN SATURATION: 98 % | SYSTOLIC BLOOD PRESSURE: 132 MMHG | WEIGHT: 230 LBS | BODY MASS INDEX: 31.15 KG/M2 | RESPIRATION RATE: 16 BRPM | HEART RATE: 75 BPM | DIASTOLIC BLOOD PRESSURE: 82 MMHG

## 2022-09-01 DIAGNOSIS — Z01.818 PRE-OP EXAMINATION: ICD-10-CM

## 2022-09-01 DIAGNOSIS — R94.39 ABNORMAL CARDIOVASCULAR STRESS TEST: ICD-10-CM

## 2022-09-01 DIAGNOSIS — I51.9 LV DYSFUNCTION: ICD-10-CM

## 2022-09-01 DIAGNOSIS — I10 ESSENTIAL HYPERTENSION: Primary | ICD-10-CM

## 2022-09-01 DIAGNOSIS — I48.0 PAROXYSMAL ATRIAL FIBRILLATION: ICD-10-CM

## 2022-09-01 PROCEDURE — 99214 PR OFFICE/OUTPT VISIT, EST, LEVL IV, 30-39 MIN: ICD-10-PCS | Mod: S$GLB,,, | Performed by: INTERNAL MEDICINE

## 2022-09-01 PROCEDURE — 3075F PR MOST RECENT SYSTOLIC BLOOD PRESS GE 130-139MM HG: ICD-10-PCS | Mod: CPTII,S$GLB,, | Performed by: INTERNAL MEDICINE

## 2022-09-01 PROCEDURE — 3079F DIAST BP 80-89 MM HG: CPT | Mod: CPTII,S$GLB,, | Performed by: INTERNAL MEDICINE

## 2022-09-01 PROCEDURE — 1159F PR MEDICATION LIST DOCUMENTED IN MEDICAL RECORD: ICD-10-PCS | Mod: CPTII,S$GLB,, | Performed by: INTERNAL MEDICINE

## 2022-09-01 PROCEDURE — 4010F PR ACE/ARB THEARPY RXD/TAKEN: ICD-10-PCS | Mod: CPTII,S$GLB,, | Performed by: INTERNAL MEDICINE

## 2022-09-01 PROCEDURE — 4010F ACE/ARB THERAPY RXD/TAKEN: CPT | Mod: CPTII,S$GLB,, | Performed by: INTERNAL MEDICINE

## 2022-09-01 PROCEDURE — 3075F SYST BP GE 130 - 139MM HG: CPT | Mod: CPTII,S$GLB,, | Performed by: INTERNAL MEDICINE

## 2022-09-01 PROCEDURE — 99214 OFFICE O/P EST MOD 30 MIN: CPT | Mod: S$GLB,,, | Performed by: INTERNAL MEDICINE

## 2022-09-01 PROCEDURE — 1159F MED LIST DOCD IN RCRD: CPT | Mod: CPTII,S$GLB,, | Performed by: INTERNAL MEDICINE

## 2022-09-01 PROCEDURE — 3008F BODY MASS INDEX DOCD: CPT | Mod: CPTII,S$GLB,, | Performed by: INTERNAL MEDICINE

## 2022-09-01 PROCEDURE — 3008F PR BODY MASS INDEX (BMI) DOCUMENTED: ICD-10-PCS | Mod: CPTII,S$GLB,, | Performed by: INTERNAL MEDICINE

## 2022-09-01 PROCEDURE — 3079F PR MOST RECENT DIASTOLIC BLOOD PRESSURE 80-89 MM HG: ICD-10-PCS | Mod: CPTII,S$GLB,, | Performed by: INTERNAL MEDICINE

## 2022-09-01 NOTE — ASSESSMENT & PLAN NOTE
Plan for angiographic assessment  Discussed with patient the risks benefits and options and risks include but not limited to bleeding, vascular damage, renal failure, stroke, myocardial infarction, emergency bypass surgery and death.  All questions have been answered to patient's satisfaction.  Patient has voiced understanding of the procedure and agrees to move forward.  Informed Consent Obtained  See orders for further details.

## 2022-09-01 NOTE — ASSESSMENT & PLAN NOTE
Continue Coreg and Lisinopril at present dosing  Continue lasix and aldactone  Euvolemic on exam

## 2022-09-01 NOTE — PROGRESS NOTES
Subjective:    Patient ID:  Riana Beltran is a 61 y.o. male patient here for evaluation Results (Positive Stress)      History of Present Illness:       Patient is here for follow-up visit.  Denies chest pain or shortness of breath.  Denies recent fever cough chills or congestion.  Denies blood in the urine or blood in the stool.  Denies myalgias  Denies orthopnea or peripheral edema  Denies nausea vomiting or dyspepsia  No recent arm neck or jaw pain.    No lightheadedness or dizziness.   Recent stress is positive with RI in the inferolateral walls.  Euvolemic on exam.       Review of patient's allergies indicates:  No Known Allergies    Past Medical History:   Diagnosis Date    Back pain     Bilateral radial fractures     Broken ribs 2020    Neck pain      Past Surgical History:   Procedure Laterality Date    ANKLE SURGERY Right     APPENDECTOMY      KNEE SURGERY Left     patella realignment    LATERAL EPICONDYLE RELEASE Right 3/11/2020    Procedure: RELEASE, ELBOW, LATERAL EPICONDYLE;  Surgeon: Saul Wynn MD;  Location: [facility] OR;  Service: Orthopedics;  Laterality: Right;    NECK SURGERY      laminectomy    OPEN REDUCTION AND INTERNAL FIXATION (ORIF) OF FRACTURE OF RADIUS Right 3/11/2020    Procedure: ORIF, FRACTURE, RADIUS;  Surgeon: Saul Wynn MD;  Location: [facility] OR;  Service: Orthopedics;  Laterality: Right;  Synthes, needs compression screws BENJAMIN SANTIAGO NOTIFIED    OSTEOTOMY OF ZYGOMATIC BONE AND ORBIT      TONSILLECTOMY       Social History     Tobacco Use    Smoking status: Former     Packs/day: 1.00     Years: 10.00     Pack years: 10.00     Types: Cigarettes     Quit date: 2022     Years since quittin.0    Smokeless tobacco: Never   Substance Use Topics    Alcohol use: Yes        Review of Systems:    As noted in HPI in addition      REVIEW OF SYSTEMS  CARDIOVASCULAR: No recent chest pain, palpitations, arm, neck, or jaw pain  RESPIRATORY: No recent fever, cough  chills, SOB or congestion  : No blood in the urine  GI: No Nausea, vomiting, constipation, diarrhea, blood, or reflux.  MUSCULOSKELETAL: No myalgias  NEURO: No lightheadedness or dizziness  EYES: No Double vision, blurry, vision or headache              Objective        Vitals:    09/01/22 1308   BP: 132/82   Pulse: 75   Resp: 16       LIPIDS - LAST 2   No results found for: CHOL, HDL, LDLCALC, TRIG, CHOLHDL    CBC - LAST 2  Lab Results   Component Value Date    WBC 9.15 03/04/2020    RBC 4.91 03/04/2020    HGB 15.4 03/04/2020    HCT 45.3 03/04/2020    MCV 92 03/04/2020    MCH 31.4 (H) 03/04/2020    MCHC 34.0 03/04/2020    RDW 11.7 03/04/2020     03/04/2020    MPV 10.9 03/04/2020    GRAN 5.7 03/04/2020    GRAN 62.1 03/04/2020    LYMPH 2.1 03/04/2020    LYMPH 22.6 03/04/2020    MONO 0.9 03/04/2020    MONO 10.2 03/04/2020    BASO 0.06 03/04/2020    NRBC 0 03/04/2020       CHEMISTRY & LIVER FUNCTION - LAST 2  Lab Results   Component Value Date     03/04/2020    K 3.7 03/04/2020     03/04/2020    CO2 24 03/04/2020    ANIONGAP 10 03/04/2020    BUN 10 03/04/2020    CREATININE 0.6 03/04/2020    GLU 98 03/04/2020    CALCIUM 9.2 03/04/2020    ALBUMIN 4.4 03/04/2020    PROT 7.7 03/04/2020    ALKPHOS 96 03/04/2020    ALT 69 (H) 03/04/2020    AST 63 (H) 03/04/2020    BILITOT 1.1 (H) 03/04/2020        CARDIAC PROFILE - LAST 2  No results found for: BNP, CPK, CPKMB, LDH, TROPONINI     COAGULATION - LAST 2  No results found for: LABPT, INR, APTT    ENDOCRINE & PSA - LAST 2  No results found for: HGBA1C, MICROALBUR, TSH, PROCAL, PSA     ECHOCARDIOGRAM RESULTS  No results found for this or any previous visit.      CURRENT/PREVIOUS VISIT EKG  Results for orders placed or performed in visit on 08/24/22   IN OFFICE EKG 12-LEAD (to [place])    Collection Time: 08/24/22  3:55 PM    Narrative    Test Reason : I51.9,R06.02,I48.0,I10,    Vent. Rate : 074 BPM     Atrial Rate : 074 BPM     P-R Int : 180 ms          QRS Dur :  116 ms      QT Int : 420 ms       P-R-T Axes : 007 007 099 degrees     QTc Int : 466 ms    Normal sinus rhythm  T wave abnormality, consider lateral ischemia  Prolonged QT  Abnormal ECG  When compared with ECG of 04-MAR-2020 14:40,  Questionable change in The axis  T wave inversion more evident in Lateral leads    Referred By: [email or web address]   SELF           Confirmed By:      No valid procedures specified.   Results for orders placed during the hospital encounter of 08/29/22    Nuclear Stress - Cardiology Interpreted    Interpretation Summary    Abnormal myocardial perfusion scan.    There is a moderate intensity, small to moderate sized, reversible perfusion abnormality that is consistent with ischemia in the inferolateral wall(s).    There are no other significant perfusion abnormalities.    The gated perfusion images showed an ejection fraction of 25% post stress. Normal ejection fraction is greater than 47%.    There is  and severe global hypokinesis at stress .    LV cavity size is  and moderately enlarged at stress.    The EKG portion of this study is negative for ischemia.    The patient reported no chest pain during the stress test.    During stress, occasional PVCs are noted.      PHYSICAL EXAM  CONSTITUTIONAL: Well built, well nourished in no apparent distress  NECK: no carotid bruit, no JVD  LUNGS: CTA  CHEST WALL: no tenderness  HEART: regular rate and rhythm, S1, S2 normal, no murmur, click, rub or gallop   ABDOMEN: soft, non-tender; bowel sounds normal; no masses,  no organomegaly  EXTREMITIES: Extremities normal, no edema, no calf tenderness noted  NEURO: AAO X 3    I HAVE REVIEWED :    The vital signs, nurses notes, and all the pertinent radiology and labs.        Current Outpatient Medications   Medication Instructions    apixaban (ELIQUIS) 5 mg, Oral, 2 times daily    carvediloL (COREG) 6.25 mg, Oral, 2 times daily    furosemide (LASIX) 20 mg, Oral, Every other day    ibuprofen (ADVIL,MOTRIN) 200 mg,  Oral, Every 6 hours PRN    lisinopriL 10 mg, Oral, Daily    magnesium oxide (MAG-OX) 400 mg, Oral, Daily    spironolactone (ALDACTONE) 25 mg, Oral, Daily          Assessment & Plan     Paroxysmal atrial fibrillation  Currently in NSR  Continue on present regimen to include Eliquis and Coreg at present doses.    LV dysfunction  Continue Coreg and Lisinopril at present dosing  Continue lasix and aldactone  Euvolemic on exam      Abnormal cardiovascular stress test  Plan for angiographic assessment  Discussed with patient the risks benefits and options and risks include but not limited to bleeding, vascular damage, renal failure, stroke, myocardial infarction, emergency bypass surgery and death.  All questions have been answered to patient's satisfaction.  Patient has voiced understanding of the procedure and agrees to move forward.  Informed Consent Obtained  See orders for further details.    Essential hypertension  BP Stable.           No follow-ups on file.

## 2022-09-08 ENCOUNTER — TELEPHONE (OUTPATIENT)
Dept: CARDIOLOGY | Facility: CLINIC | Age: 62
End: 2022-09-08
Payer: COMMERCIAL

## 2022-09-08 NOTE — TELEPHONE ENCOUNTER
----- Message from Shira Suarez NP sent at 9/1/2022  9:14 AM CDT -----  Needs an apt positive stress

## 2022-09-12 ENCOUNTER — PATIENT MESSAGE (OUTPATIENT)
Dept: CARDIOLOGY | Facility: CLINIC | Age: 62
End: 2022-09-12
Payer: COMMERCIAL

## 2022-09-12 ENCOUNTER — HOSPITAL ENCOUNTER (OUTPATIENT)
Dept: PREADMISSION TESTING | Facility: HOSPITAL | Age: 62
Discharge: HOME | End: 2022-09-12
Attending: INTERNAL MEDICINE
Payer: COMMERCIAL

## 2022-09-12 ENCOUNTER — HOSPITAL ENCOUNTER (OUTPATIENT)
Dept: RADIOLOGY | Facility: HOSPITAL | Age: 62
Discharge: HOME | End: 2022-09-12
Attending: NURSE PRACTITIONER
Payer: COMMERCIAL

## 2022-09-12 VITALS — BODY MASS INDEX: 31.15 KG/M2 | WEIGHT: 230 LBS | HEIGHT: 72 IN

## 2022-09-12 DIAGNOSIS — R07.89 OTHER CHEST PAIN: ICD-10-CM

## 2022-09-12 DIAGNOSIS — Z01.818 PRE-OP EXAM: Primary | ICD-10-CM

## 2022-09-12 DIAGNOSIS — I51.9 LV DYSFUNCTION: ICD-10-CM

## 2022-09-12 DIAGNOSIS — R06.02 SHORTNESS OF BREATH: ICD-10-CM

## 2022-09-12 DIAGNOSIS — I48.0 PAROXYSMAL ATRIAL FIBRILLATION: ICD-10-CM

## 2022-09-12 LAB
ANION GAP SERPL CALC-SCNC: 12 MMOL/L (ref 8–16)
ANION GAP SERPL CALC-SCNC: 12 MMOL/L (ref 8–16)
APTT PPP: 34.8 SEC (ref 23.3–35.1)
BASOPHILS # BLD AUTO: 0.05 K/UL (ref 0–0.2)
BASOPHILS NFR BLD: 0.6 % (ref 0–1.9)
BUN SERPL-MCNC: 14 MG/DL (ref 6–20)
BUN SERPL-MCNC: 14 MG/DL (ref 6–20)
CALCIUM SERPL-MCNC: 9.7 MG/DL (ref 8.7–10.5)
CALCIUM SERPL-MCNC: 9.7 MG/DL (ref 8.7–10.5)
CHLORIDE SERPL-SCNC: 102 MMOL/L (ref 95–110)
CHLORIDE SERPL-SCNC: 102 MMOL/L (ref 95–110)
CO2 SERPL-SCNC: 24 MMOL/L (ref 23–29)
CO2 SERPL-SCNC: 24 MMOL/L (ref 23–29)
CREAT SERPL-MCNC: 0.6 MG/DL (ref 0.5–1.4)
CREAT SERPL-MCNC: 0.6 MG/DL (ref 0.5–1.4)
DIFFERENTIAL METHOD: NORMAL
EOSINOPHIL # BLD AUTO: 0.5 K/UL (ref 0–0.5)
EOSINOPHIL NFR BLD: 6.8 % (ref 0–8)
ERYTHROCYTE [DISTWIDTH] IN BLOOD BY AUTOMATED COUNT: 11.9 % (ref 11.5–14.5)
EST. GFR  (NO RACE VARIABLE): >60 ML/MIN/1.73 M^2
EST. GFR  (NO RACE VARIABLE): >60 ML/MIN/1.73 M^2
GLUCOSE SERPL-MCNC: 119 MG/DL (ref 70–110)
GLUCOSE SERPL-MCNC: 119 MG/DL (ref 70–110)
HCT VFR BLD AUTO: 47.8 % (ref 40–54)
HGB BLD-MCNC: 16.3 G/DL (ref 14–18)
IMM GRANULOCYTES # BLD AUTO: 0.02 K/UL (ref 0–0.04)
IMM GRANULOCYTES NFR BLD AUTO: 0.3 % (ref 0–0.5)
LYMPHOCYTES # BLD AUTO: 2.5 K/UL (ref 1–4.8)
LYMPHOCYTES NFR BLD: 32.1 % (ref 18–48)
MCH RBC QN AUTO: 31 PG (ref 27–31)
MCHC RBC AUTO-ENTMCNC: 34.1 G/DL (ref 32–36)
MCV RBC AUTO: 91 FL (ref 82–98)
MONOCYTES # BLD AUTO: 0.7 K/UL (ref 0.3–1)
MONOCYTES NFR BLD: 9.4 % (ref 4–15)
NEUTROPHILS # BLD AUTO: 4 K/UL (ref 1.8–7.7)
NEUTROPHILS NFR BLD: 50.8 % (ref 38–73)
NRBC BLD-RTO: 0 /100 WBC
PLATELET # BLD AUTO: 329 K/UL (ref 150–450)
PMV BLD AUTO: 10.4 FL (ref 9.2–12.9)
POTASSIUM SERPL-SCNC: 4.2 MMOL/L (ref 3.5–5.1)
POTASSIUM SERPL-SCNC: 4.2 MMOL/L (ref 3.5–5.1)
RBC # BLD AUTO: 5.26 M/UL (ref 4.6–6.2)
SODIUM SERPL-SCNC: 138 MMOL/L (ref 136–145)
SODIUM SERPL-SCNC: 138 MMOL/L (ref 136–145)
WBC # BLD AUTO: 7.84 K/UL (ref 3.9–12.7)

## 2022-09-12 PROCEDURE — 93010 EKG 12-LEAD: ICD-10-PCS | Mod: ,,, | Performed by: INTERNAL MEDICINE

## 2022-09-12 PROCEDURE — 93005 ELECTROCARDIOGRAM TRACING: CPT | Performed by: INTERNAL MEDICINE

## 2022-09-12 PROCEDURE — 93010 ELECTROCARDIOGRAM REPORT: CPT | Mod: ,,, | Performed by: INTERNAL MEDICINE

## 2022-09-12 PROCEDURE — 80048 BASIC METABOLIC PNL TOTAL CA: CPT | Performed by: NURSE PRACTITIONER

## 2022-09-12 PROCEDURE — 71046 X-RAY EXAM CHEST 2 VIEWS: CPT | Mod: TC

## 2022-09-12 PROCEDURE — 85025 COMPLETE CBC W/AUTO DIFF WBC: CPT | Performed by: NURSE PRACTITIONER

## 2022-09-12 PROCEDURE — 36415 COLL VENOUS BLD VENIPUNCTURE: CPT | Performed by: NURSE PRACTITIONER

## 2022-09-12 PROCEDURE — 85730 THROMBOPLASTIN TIME PARTIAL: CPT | Performed by: NURSE PRACTITIONER

## 2022-09-12 RX ORDER — SODIUM CHLORIDE 9 MG/ML
INJECTION, SOLUTION INTRAVENOUS ONCE
Status: CANCELLED | OUTPATIENT
Start: 2022-09-12 | End: 2022-09-12

## 2022-09-12 RX ORDER — DIPHENHYDRAMINE HCL 25 MG
50 CAPSULE ORAL
Status: CANCELLED | OUTPATIENT
Start: 2022-09-12

## 2022-09-12 NOTE — TELEPHONE ENCOUNTER
Called and spoke with the patient, he is aware the orders were corrected and his procedure is still on for Wednesday. He is due for preop today

## 2022-09-12 NOTE — DISCHARGE INSTRUCTIONS
Nothing to eat or drink after midnight the night before your procedure.  Do not take any medications the morning of your procedure  Bring all your medications with you in the original pill bottles from pharmacy.  If you take blood thinners, ask your doctor if you should stop taking them.  Do not take metformin 24 hours prior to your procedure.  Adjust your insulin or other diabetes medications if needed.   Do your chlorhexidine wash the night before and morning of your procedure.  If you use a CPAP or BiPAP at home, please bring it with you the day of your procedure.  Make arrangements for someone you know to drive you home after your procedure. Taxi and Uber are not acceptable.  op

## 2022-09-14 ENCOUNTER — HOSPITAL ENCOUNTER (OUTPATIENT)
Facility: HOSPITAL | Age: 62
Discharge: HOME | End: 2022-09-14
Attending: INTERNAL MEDICINE | Admitting: INTERNAL MEDICINE
Payer: COMMERCIAL

## 2022-09-14 ENCOUNTER — PATIENT MESSAGE (OUTPATIENT)
Dept: CARDIOLOGY | Facility: HOSPITAL | Age: 62
End: 2022-09-14

## 2022-09-14 VITALS
SYSTOLIC BLOOD PRESSURE: 129 MMHG | OXYGEN SATURATION: 97 % | DIASTOLIC BLOOD PRESSURE: 90 MMHG | HEART RATE: 59 BPM | RESPIRATION RATE: 23 BRPM

## 2022-09-14 DIAGNOSIS — R94.39 ABNORMAL CARDIOVASCULAR STRESS TEST: Primary | ICD-10-CM

## 2022-09-14 DIAGNOSIS — I51.9 LV DYSFUNCTION: ICD-10-CM

## 2022-09-14 DIAGNOSIS — I50.20 SYSTOLIC CONGESTIVE HEART FAILURE, UNSPECIFIED HF CHRONICITY: ICD-10-CM

## 2022-09-14 DIAGNOSIS — I25.10 CAD (CORONARY ARTERY DISEASE): ICD-10-CM

## 2022-09-14 LAB — CATH EF QUANTITATIVE: 30 %

## 2022-09-14 PROCEDURE — 99152 MOD SED SAME PHYS/QHP 5/>YRS: CPT | Performed by: INTERNAL MEDICINE

## 2022-09-14 PROCEDURE — 99152 MOD SED SAME PHYS/QHP 5/>YRS: CPT | Mod: ,,, | Performed by: INTERNAL MEDICINE

## 2022-09-14 PROCEDURE — 93458 PR CATH PLACE/CORON ANGIO, IMG SUPER/INTERP,W LEFT HEART VENTRICULOGRAPHY: ICD-10-PCS | Mod: 26,,, | Performed by: INTERNAL MEDICINE

## 2022-09-14 PROCEDURE — 25000003 PHARM REV CODE 250: Performed by: INTERNAL MEDICINE

## 2022-09-14 PROCEDURE — 99152 PR MOD CONSCIOUS SEDATION, SAME PHYS, 5+ YRS, FIRST 15 MIN: ICD-10-PCS | Mod: ,,, | Performed by: INTERNAL MEDICINE

## 2022-09-14 PROCEDURE — 25000003 PHARM REV CODE 250: Performed by: NURSE PRACTITIONER

## 2022-09-14 PROCEDURE — 93458 L HRT ARTERY/VENTRICLE ANGIO: CPT | Mod: 26,,, | Performed by: INTERNAL MEDICINE

## 2022-09-14 PROCEDURE — C1894 INTRO/SHEATH, NON-LASER: HCPCS | Performed by: INTERNAL MEDICINE

## 2022-09-14 PROCEDURE — 99153 MOD SED SAME PHYS/QHP EA: CPT | Performed by: INTERNAL MEDICINE

## 2022-09-14 PROCEDURE — C1887 CATHETER, GUIDING: HCPCS | Performed by: INTERNAL MEDICINE

## 2022-09-14 PROCEDURE — 63600175 PHARM REV CODE 636 W HCPCS: Performed by: INTERNAL MEDICINE

## 2022-09-14 PROCEDURE — 93458 L HRT ARTERY/VENTRICLE ANGIO: CPT | Performed by: INTERNAL MEDICINE

## 2022-09-14 RX ORDER — ACETAMINOPHEN 325 MG/1
650 TABLET ORAL EVERY 4 HOURS PRN
Status: DISCONTINUED | OUTPATIENT
Start: 2022-09-14 | End: 2022-09-14 | Stop reason: HOSPADM

## 2022-09-14 RX ORDER — HEPARIN SODIUM 1000 [USP'U]/ML
INJECTION, SOLUTION INTRAVENOUS; SUBCUTANEOUS
Status: DISCONTINUED | OUTPATIENT
Start: 2022-09-14 | End: 2022-09-14 | Stop reason: HOSPADM

## 2022-09-14 RX ORDER — NITROGLYCERIN 0.4 MG/1
0.4 TABLET SUBLINGUAL EVERY 5 MIN PRN
Status: DISCONTINUED | OUTPATIENT
Start: 2022-09-14 | End: 2022-09-14 | Stop reason: HOSPADM

## 2022-09-14 RX ORDER — VERAPAMIL HYDROCHLORIDE 2.5 MG/ML
INJECTION, SOLUTION INTRAVENOUS
Status: DISCONTINUED | OUTPATIENT
Start: 2022-09-14 | End: 2022-09-14

## 2022-09-14 RX ORDER — FENTANYL CITRATE 50 UG/ML
INJECTION, SOLUTION INTRAMUSCULAR; INTRAVENOUS
Status: DISCONTINUED | OUTPATIENT
Start: 2022-09-14 | End: 2022-09-14 | Stop reason: HOSPADM

## 2022-09-14 RX ORDER — SODIUM CHLORIDE 450 MG/100ML
INJECTION, SOLUTION INTRAVENOUS CONTINUOUS
Status: DISCONTINUED | OUTPATIENT
Start: 2022-09-14 | End: 2022-09-14 | Stop reason: HOSPADM

## 2022-09-14 RX ORDER — NITROGLYCERIN 5 MG/ML
INJECTION, SOLUTION INTRAVENOUS
Status: DISCONTINUED | OUTPATIENT
Start: 2022-09-14 | End: 2022-09-14 | Stop reason: HOSPADM

## 2022-09-14 RX ORDER — ONDANSETRON 2 MG/ML
4 INJECTION INTRAMUSCULAR; INTRAVENOUS EVERY 12 HOURS PRN
Status: DISCONTINUED | OUTPATIENT
Start: 2022-09-14 | End: 2022-09-14 | Stop reason: HOSPADM

## 2022-09-14 RX ORDER — MIDAZOLAM HYDROCHLORIDE 1 MG/ML
INJECTION INTRAMUSCULAR; INTRAVENOUS
Status: DISCONTINUED | OUTPATIENT
Start: 2022-09-14 | End: 2022-09-14 | Stop reason: HOSPADM

## 2022-09-14 RX ORDER — SODIUM CHLORIDE 9 MG/ML
INJECTION, SOLUTION INTRAVENOUS ONCE
Status: COMPLETED | OUTPATIENT
Start: 2022-09-14 | End: 2022-09-14

## 2022-09-14 RX ORDER — DIPHENHYDRAMINE HCL 25 MG
50 CAPSULE ORAL
Status: DISCONTINUED | OUTPATIENT
Start: 2022-09-14 | End: 2022-09-14 | Stop reason: HOSPADM

## 2022-09-14 RX ADMIN — DIPHENHYDRAMINE HYDROCHLORIDE 50 MG: 25 CAPSULE ORAL at 06:09

## 2022-09-14 RX ADMIN — SODIUM CHLORIDE: 0.9 INJECTION, SOLUTION INTRAVENOUS at 06:09

## 2022-09-14 NOTE — Clinical Note
The catheter was inserted into the ostium   left main. An angiography was performed of the left coronary arteries. Multiple views were taken. The angiography was performed via power injection. The injected amount was 8 mL contrast at 4 mL/s.

## 2022-09-14 NOTE — Clinical Note
The right groin and right radial was prepped. The site was prepped with ChloraPrep. The site was clipped. The patient was draped. The patient was positioned supine. The patient was secured using safety straps.

## 2022-09-14 NOTE — Clinical Note
The catheter insertion attempt was made into the right coronary artery. The catheter was unable to engage the area..

## 2022-09-14 NOTE — Clinical Note
The left ventricle was injected and visualized. The injected rate was 6 mL/sec. The total injected volume was 12 mL.

## 2022-09-14 NOTE — INTERVAL H&P NOTE
The patient has been examined and the H&P has been reviewed:    I concur with the findings and no changes have occurred since H&P was written.    Anesthesia/Surgery risks, benefits and alternative options discussed and understood by patient/family.    Noted to have abnormal myocardial perfusion study with ischemia identified in the inferolateral wall and cardiomyopathy 25%.  He is now admitted for angiographic assessment for more definite diagnosis.  Risks and benefits of cardiac catheterization including risk of stroke heart attack and death and arterial damage on risk of bleeding and thromboembolic events have been identified.  Patient understands affects episodes she also consents for possible PCI as needed.  Informed consent has been obtained for all this and all questions have been answered to the patient's satisfaction.  The proceed with angiographic assessment more definite diagnosis.      There are no hospital problems to display for this patient.

## 2022-09-21 ENCOUNTER — PATIENT MESSAGE (OUTPATIENT)
Dept: CARDIOLOGY | Facility: CLINIC | Age: 62
End: 2022-09-21
Payer: COMMERCIAL

## 2022-10-05 ENCOUNTER — OFFICE VISIT (OUTPATIENT)
Dept: CARDIOLOGY | Facility: CLINIC | Age: 62
End: 2022-10-05
Payer: COMMERCIAL

## 2022-10-05 VITALS
HEIGHT: 72 IN | WEIGHT: 240 LBS | HEART RATE: 68 BPM | SYSTOLIC BLOOD PRESSURE: 130 MMHG | BODY MASS INDEX: 32.51 KG/M2 | DIASTOLIC BLOOD PRESSURE: 82 MMHG

## 2022-10-05 DIAGNOSIS — Z78.9 FALSE POSITIVE STRESS TEST: ICD-10-CM

## 2022-10-05 DIAGNOSIS — I48.0 PAROXYSMAL ATRIAL FIBRILLATION: ICD-10-CM

## 2022-10-05 DIAGNOSIS — I10 ESSENTIAL HYPERTENSION: ICD-10-CM

## 2022-10-05 DIAGNOSIS — I42.8 NONISCHEMIC CARDIOMYOPATHY: Primary | ICD-10-CM

## 2022-10-05 DIAGNOSIS — I50.20 SYSTOLIC CONGESTIVE HEART FAILURE, UNSPECIFIED HF CHRONICITY: ICD-10-CM

## 2022-10-05 PROCEDURE — 3075F SYST BP GE 130 - 139MM HG: CPT | Mod: CPTII,S$GLB,, | Performed by: NURSE PRACTITIONER

## 2022-10-05 PROCEDURE — 3008F BODY MASS INDEX DOCD: CPT | Mod: CPTII,S$GLB,, | Performed by: NURSE PRACTITIONER

## 2022-10-05 PROCEDURE — 3008F PR BODY MASS INDEX (BMI) DOCUMENTED: ICD-10-PCS | Mod: CPTII,S$GLB,, | Performed by: NURSE PRACTITIONER

## 2022-10-05 PROCEDURE — 99214 PR OFFICE/OUTPT VISIT, EST, LEVL IV, 30-39 MIN: ICD-10-PCS | Mod: S$GLB,,, | Performed by: NURSE PRACTITIONER

## 2022-10-05 PROCEDURE — 4010F ACE/ARB THERAPY RXD/TAKEN: CPT | Mod: CPTII,S$GLB,, | Performed by: NURSE PRACTITIONER

## 2022-10-05 PROCEDURE — 1160F PR REVIEW ALL MEDS BY PRESCRIBER/CLIN PHARMACIST DOCUMENTED: ICD-10-PCS | Mod: CPTII,S$GLB,, | Performed by: NURSE PRACTITIONER

## 2022-10-05 PROCEDURE — 1160F RVW MEDS BY RX/DR IN RCRD: CPT | Mod: CPTII,S$GLB,, | Performed by: NURSE PRACTITIONER

## 2022-10-05 PROCEDURE — 1159F PR MEDICATION LIST DOCUMENTED IN MEDICAL RECORD: ICD-10-PCS | Mod: CPTII,S$GLB,, | Performed by: NURSE PRACTITIONER

## 2022-10-05 PROCEDURE — 3079F PR MOST RECENT DIASTOLIC BLOOD PRESSURE 80-89 MM HG: ICD-10-PCS | Mod: CPTII,S$GLB,, | Performed by: NURSE PRACTITIONER

## 2022-10-05 PROCEDURE — 99214 OFFICE O/P EST MOD 30 MIN: CPT | Mod: S$GLB,,, | Performed by: NURSE PRACTITIONER

## 2022-10-05 PROCEDURE — 3079F DIAST BP 80-89 MM HG: CPT | Mod: CPTII,S$GLB,, | Performed by: NURSE PRACTITIONER

## 2022-10-05 PROCEDURE — 3075F PR MOST RECENT SYSTOLIC BLOOD PRESS GE 130-139MM HG: ICD-10-PCS | Mod: CPTII,S$GLB,, | Performed by: NURSE PRACTITIONER

## 2022-10-05 PROCEDURE — 4010F PR ACE/ARB THEARPY RXD/TAKEN: ICD-10-PCS | Mod: CPTII,S$GLB,, | Performed by: NURSE PRACTITIONER

## 2022-10-05 PROCEDURE — 1159F MED LIST DOCD IN RCRD: CPT | Mod: CPTII,S$GLB,, | Performed by: NURSE PRACTITIONER

## 2022-10-05 RX ORDER — SACUBITRIL AND VALSARTAN 24; 26 MG/1; MG/1
1 TABLET, FILM COATED ORAL 2 TIMES DAILY
Qty: 180 TABLET | Refills: 3 | Status: SHIPPED | OUTPATIENT
Start: 2022-10-05 | End: 2023-01-06 | Stop reason: SDUPTHER

## 2022-10-05 NOTE — PROGRESS NOTES
Subjective:    Patient ID:  Riana Beltran is a 61 y.o. male       HPI:  Patient presents today for follow-up post angiogram.  Patient was found to have normal coronary arteries but remains with a reduced ejection fraction about 30%.  Patient denies any chest discomfort or shortness of breath.  He denies any palpitations.  He is frustrated and ready to go back to his activities as he has restricting his activities.    Review of patient's allergies indicates:  No Known Allergies    Past Medical History:   Diagnosis Date    Back pain     Bilateral radial fractures     Broken ribs 2020    Neck pain      Past Surgical History:   Procedure Laterality Date    ANKLE SURGERY Right     APPENDECTOMY      KNEE SURGERY Left     patella realignment    LATERAL EPICONDYLE RELEASE Right 3/11/2020    Procedure: RELEASE, ELBOW, LATERAL EPICONDYLE;  Surgeon: Saul Wynn MD;  Location: [facility] OR;  Service: Orthopedics;  Laterality: Right;    LEFT HEART CATHETERIZATION Left 2022    Procedure: Left heart cath;  Surgeon: Jamel Dejesus MD;  Location: [facility] CATH/EP LAB;  Service: Cardiology;  Laterality: Left;    NECK SURGERY      laminectomy    OPEN REDUCTION AND INTERNAL FIXATION (ORIF) OF FRACTURE OF RADIUS Right 3/11/2020    Procedure: ORIF, FRACTURE, RADIUS;  Surgeon: Saul Wynn MD;  Location: [facility] OR;  Service: Orthopedics;  Laterality: Right;  Synthes, needs compression screws BENJAMIN SANTIAGO NOTIFIED    OSTEOTOMY OF ZYGOMATIC BONE AND ORBIT      TONSILLECTOMY       Social History     Tobacco Use    Smoking status: Former     Packs/day: 1.00     Years: 10.00     Pack years: 10.00     Types: Cigarettes     Quit date: 2022     Years since quittin.1    Smokeless tobacco: Never   Substance Use Topics    Alcohol use: Not Currently    Drug use: Never     History reviewed. No pertinent family history.     Review of Systems:   Constitution: Negative for diaphoresis and fever.   HEENT: Negative for  nosebleeds.    Cardiovascular: Negative for chest pain       No dyspnea on exertion       No leg swelling        No palpitations  Respiratory: Negative for shortness of breath and wheezing.    Hematologic/Lymphatic: Negative for bleeding problem. Does not bruise/bleed easily.   Skin: Negative for color change and rash.   Musculoskeletal: Negative for falls and myalgias.   Gastrointestinal: Negative for hematemesis and hematochezia.   Genitourinary: Negative for hematuria.   Neurological: Negative for dizziness and light-headedness.   Psychiatric/Behavioral: Negative for altered mental status and memory loss.          Objective:        Vitals:    10/05/22 1450   BP: 130/82   Pulse: 68       Lab Results   Component Value Date    WBC 7.84 09/12/2022    HGB 16.3 09/12/2022    HCT 47.8 09/12/2022     09/12/2022    ALT 69 (H) 03/04/2020    AST 63 (H) 03/04/2020     09/12/2022     09/12/2022    K 4.2 09/12/2022    K 4.2 09/12/2022     09/12/2022     09/12/2022    CREATININE 0.6 09/12/2022    CREATININE 0.6 09/12/2022    BUN 14 09/12/2022    BUN 14 09/12/2022    CO2 24 09/12/2022    CO2 24 09/12/2022        ECHOCARDIOGRAM RESULTS  No results found for this or any previous visit.        CURRENT/PREVIOUS VISIT EKG  Results for orders placed or performed during the hospital encounter of 09/12/22   EKG 12-lead    Collection Time: 09/12/22  3:24 PM    Narrative    Test Reason :     Vent. Rate : 072 BPM     Atrial Rate : 072 BPM     P-R Int : 160 ms          QRS Dur : 110 ms      QT Int : 424 ms       P-R-T Axes : 216 191 172 degrees     QTc Int : 464 ms     Suspect arm lead reversal, interpretation assumes no reversal  Unusual P axis, possible ectopic atrial rhythm with occasional Premature  ventricular complexes  Right superior axis deviation  Inferior infarct ,age undetermined  Abnormal ECG  When compared with ECG of 24-AUG-2022 15:55,  Ectopic atrial rhythm has replaced Sinus rhythm  The axis  Shifted left  Inferior infarct is now Present  Nonspecific T wave abnormality has replaced inverted T waves in Lateral  leads  Confirmed by Sreekanth German MD ([id number]) on 9/19/2022 9:41:55 AM    Referred By:             Confirmed By:Sreekanth German MD     No valid procedures specified.   Results for orders placed during the hospital encounter of 08/29/22    Nuclear Stress - Cardiology Interpreted    Interpretation Summary    Abnormal myocardial perfusion scan.    There is a moderate intensity, small to moderate sized, reversible perfusion abnormality that is consistent with ischemia in the inferolateral wall(s).    There are no other significant perfusion abnormalities.    The gated perfusion images showed an ejection fraction of 25% post stress. Normal ejection fraction is greater than 47%.    There is  and severe global hypokinesis at stress .    LV cavity size is  and moderately enlarged at stress.    The EKG portion of this study is negative for ischemia.    The patient reported no chest pain during the stress test.    During stress, occasional PVCs are noted.      Physical Exam:  CONSTITUTIONAL: No fever, no chills  HEENT: Normocephalic, atraumatic,pupils reactive to light                 NECK:  No JVD no carotid bruit  CVS: S1S2+, RRR, no murmurs,   LUNGS: Clear  ABDOMEN: Soft, NT, BS+  EXTREMITIES: No cyanosis, edema  : No holcomb catheter  NEURO: AAO X 3  PSY: Normal affect      Medication List with Changes/Refills   New Medications    SACUBITRIL-VALSARTAN (ENTRESTO) 24-26 MG PER TABLET    Take 1 tablet by mouth 2 (two) times daily.   Current Medications    APIXABAN (ELIQUIS) 5 MG TAB    Take 1 tablet (5 mg total) by mouth 2 (two) times daily.    CARVEDILOL (COREG) 6.25 MG TABLET    Take 1 tablet (6.25 mg total) by mouth 2 (two) times daily.    FUROSEMIDE (LASIX) 20 MG TABLET    Take 1 tablet (20 mg total) by mouth every other day.    IBUPROFEN (ADVIL,MOTRIN) 200 MG TABLET    Take 200 mg by mouth every 6  (six) hours as needed for Pain.    MAGNESIUM OXIDE (MAG-OX) 400 MG (241.3 MG MAGNESIUM) TABLET    Take 1 tablet (400 mg total) by mouth once daily.    SPIRONOLACTONE (ALDACTONE) 25 MG TABLET    Take 1 tablet (25 mg total) by mouth once daily.   Discontinued Medications    LISINOPRIL 10 MG TABLET    Take 1 tablet (10 mg total) by mouth once daily.             Assessment:       1. Nonischemic cardiomyopathy    2. Essential hypertension    3. Paroxysmal atrial fibrillation    4. Systolic congestive heart failure, unspecified HF chronicity    5. False positive stress test         Plan:     Problem List Items Addressed This Visit          Unprioritized    LV dysfunction - Primary    Current Assessment & Plan     Patient has an ejection fraction about 30%, and he has normal coronary arteries.    Continue Coreg 6.25 mg p.o. b.i.d.   Stop lisinopril and start Entresto 24/26 mg po BID.     Plan to repeat an echocardiogram in November 2022.  If LV function remains decreased, consideration for ICD placement.         Paroxysmal atrial fibrillation    Current Assessment & Plan     Continue Eliquis 5 mg p.o. b.i.d..         Essential hypertension    Current Assessment & Plan     Continue current medications.  Blood pressure stable.         Relevant Orders    Echo    Systolic congestive heart failure    Current Assessment & Plan     Patient appears euvolemic on exam.  Continue with current medications.  Would like to repeat 2D echo to re-evaluate LV function.           False positive stress test    Current Assessment & Plan     False-positive stress test.  Patient had normal coronary arteries.            Follow up in about 2 months (around 12/5/2022).

## 2022-10-05 NOTE — ASSESSMENT & PLAN NOTE
Patient has an ejection fraction about 30%, and he has normal coronary arteries.    Continue Coreg 6.25 mg p.o. b.i.d.   Stop lisinopril and start Entresto 24/26 mg po BID.     Plan to repeat an echocardiogram in November 2022.  If LV function remains decreased, consideration for ICD placement.

## 2022-10-05 NOTE — ASSESSMENT & PLAN NOTE
Patient appears euvolemic on exam.  Continue with current medications.  Would like to repeat 2D echo to re-evaluate LV function.

## 2022-10-10 ENCOUNTER — PATIENT MESSAGE (OUTPATIENT)
Dept: CARDIOLOGY | Facility: CLINIC | Age: 62
End: 2022-10-10
Payer: COMMERCIAL

## 2022-10-10 DIAGNOSIS — I51.9 LV DYSFUNCTION: Primary | ICD-10-CM

## 2022-10-11 ENCOUNTER — PATIENT MESSAGE (OUTPATIENT)
Dept: CARDIOLOGY | Facility: CLINIC | Age: 62
End: 2022-10-11
Payer: COMMERCIAL

## 2022-11-10 ENCOUNTER — HOSPITAL ENCOUNTER (OUTPATIENT)
Dept: CARDIOLOGY | Facility: HOSPITAL | Age: 62
Discharge: HOME | End: 2022-11-10
Attending: NURSE PRACTITIONER
Payer: COMMERCIAL

## 2022-11-10 VITALS — WEIGHT: 240 LBS | BODY MASS INDEX: 32.51 KG/M2 | HEIGHT: 72 IN

## 2022-11-10 DIAGNOSIS — I10 ESSENTIAL HYPERTENSION: ICD-10-CM

## 2022-11-10 DIAGNOSIS — I42.8 NONISCHEMIC CARDIOMYOPATHY: ICD-10-CM

## 2022-11-10 LAB
AORTIC ROOT ANNULUS: 4 CM
AORTIC VALVE CUSP SEPERATION: 2.6 CM
AV INDEX (PROSTH): 0.72
AV MEAN GRADIENT: 3 MMHG
AV PEAK GRADIENT: 6 MMHG
AV VALVE AREA: 4.12 CM2
AV VELOCITY RATIO: 0.66
BSA FOR ECHO PROCEDURE: 2.35 M2
CV ECHO LV RWT: 0.3 CM
DOP CALC AO PEAK VEL: 1.25 M/S
DOP CALC AO VTI: 24.3 CM
DOP CALC LVOT AREA: 5.7 CM2
DOP CALC LVOT DIAMETER: 2.7 CM
DOP CALC LVOT PEAK VEL: 0.83 M/S
DOP CALC LVOT STROKE VOLUME: 100.15 CM3
DOP CALCLVOT PEAK VEL VTI: 17.5 CM
E WAVE DECELERATION TIME: 254 MSEC
E/A RATIO: 1.04
E/E' RATIO: 8.91 M/S
ECHO LV POSTERIOR WALL: 0.9 CM (ref 0.6–1.1)
EJECTION FRACTION: 30 %
FRACTIONAL SHORTENING: -125 % (ref 28–44)
INTERVENTRICULAR SEPTUM: 1.17 CM (ref 0.6–1.1)
LEFT ATRIUM SIZE: 3.7 CM
LEFT INTERNAL DIMENSION IN SYSTOLE: 13.7 CM (ref 2.1–4)
LEFT VENTRICLE DIASTOLIC VOLUME INDEX: 80.87 ML/M2
LEFT VENTRICLE DIASTOLIC VOLUME: 186 ML
LEFT VENTRICLE MASS INDEX: 115 G/M2
LEFT VENTRICLE SYSTOLIC VOLUME INDEX: 59.6 ML/M2
LEFT VENTRICLE SYSTOLIC VOLUME: 137 ML
LEFT VENTRICULAR INTERNAL DIMENSION IN DIASTOLE: 6.09 CM (ref 3.5–6)
LEFT VENTRICULAR MASS: 264.74 G
LV LATERAL E/E' RATIO: 8.17 M/S
LV SEPTAL E/E' RATIO: 9.8 M/S
LVOT MG: 2 MMHG
LVOT MV: 0.59 CM/S
MV PEAK A VEL: 0.47 M/S
MV PEAK E VEL: 0.49 M/S
PISA TR MAX VEL: 2.29 M/S
RA PRESSURE: 3 MMHG
RIGHT VENTRICULAR END-DIASTOLIC DIMENSION: 2.14 CM
TDI LATERAL: 0.06 M/S
TDI SEPTAL: 0.05 M/S
TDI: 0.06 M/S
TR MAX PG: 21 MMHG
TV REST PULMONARY ARTERY PRESSURE: 24 MMHG

## 2022-11-10 PROCEDURE — 93306 TTE W/DOPPLER COMPLETE: CPT | Mod: 26,,, | Performed by: INTERNAL MEDICINE

## 2022-11-10 PROCEDURE — 93306 TTE W/DOPPLER COMPLETE: CPT

## 2022-11-10 PROCEDURE — 93306 ECHO (CUPID ONLY): ICD-10-PCS | Mod: 26,,, | Performed by: INTERNAL MEDICINE

## 2022-11-17 ENCOUNTER — OFFICE VISIT (OUTPATIENT)
Dept: CARDIOLOGY | Facility: CLINIC | Age: 62
End: 2022-11-17
Payer: COMMERCIAL

## 2022-11-17 VITALS
HEIGHT: 72 IN | WEIGHT: 247 LBS | SYSTOLIC BLOOD PRESSURE: 130 MMHG | DIASTOLIC BLOOD PRESSURE: 90 MMHG | BODY MASS INDEX: 33.46 KG/M2 | HEART RATE: 60 BPM

## 2022-11-17 DIAGNOSIS — I50.9 CHF (CONGESTIVE HEART FAILURE): ICD-10-CM

## 2022-11-17 DIAGNOSIS — I51.9 LV DYSFUNCTION: ICD-10-CM

## 2022-11-17 DIAGNOSIS — Z78.9 FALSE POSITIVE STRESS TEST: ICD-10-CM

## 2022-11-17 DIAGNOSIS — I50.20 SYSTOLIC CONGESTIVE HEART FAILURE, UNSPECIFIED HF CHRONICITY: ICD-10-CM

## 2022-11-17 DIAGNOSIS — I48.0 PAROXYSMAL ATRIAL FIBRILLATION: ICD-10-CM

## 2022-11-17 DIAGNOSIS — I10 ESSENTIAL HYPERTENSION: ICD-10-CM

## 2022-11-17 DIAGNOSIS — I42.8 NONISCHEMIC CARDIOMYOPATHY: Primary | ICD-10-CM

## 2022-11-17 PROCEDURE — 99214 PR OFFICE/OUTPT VISIT, EST, LEVL IV, 30-39 MIN: ICD-10-PCS | Mod: S$GLB,,, | Performed by: NURSE PRACTITIONER

## 2022-11-17 PROCEDURE — 99214 OFFICE O/P EST MOD 30 MIN: CPT | Mod: S$GLB,,, | Performed by: NURSE PRACTITIONER

## 2022-11-17 PROCEDURE — 99999 PR PBB SHADOW E&M-EST. PATIENT-LVL III: CPT | Mod: PBBFAC,,, | Performed by: NURSE PRACTITIONER

## 2022-11-17 PROCEDURE — 3075F PR MOST RECENT SYSTOLIC BLOOD PRESS GE 130-139MM HG: ICD-10-PCS | Mod: CPTII,S$GLB,, | Performed by: NURSE PRACTITIONER

## 2022-11-17 PROCEDURE — 1159F MED LIST DOCD IN RCRD: CPT | Mod: CPTII,S$GLB,, | Performed by: NURSE PRACTITIONER

## 2022-11-17 PROCEDURE — 3075F SYST BP GE 130 - 139MM HG: CPT | Mod: CPTII,S$GLB,, | Performed by: NURSE PRACTITIONER

## 2022-11-17 PROCEDURE — 3008F PR BODY MASS INDEX (BMI) DOCUMENTED: ICD-10-PCS | Mod: CPTII,S$GLB,, | Performed by: NURSE PRACTITIONER

## 2022-11-17 PROCEDURE — 3080F DIAST BP >= 90 MM HG: CPT | Mod: CPTII,S$GLB,, | Performed by: NURSE PRACTITIONER

## 2022-11-17 PROCEDURE — 3008F BODY MASS INDEX DOCD: CPT | Mod: CPTII,S$GLB,, | Performed by: NURSE PRACTITIONER

## 2022-11-17 PROCEDURE — 1159F PR MEDICATION LIST DOCUMENTED IN MEDICAL RECORD: ICD-10-PCS | Mod: CPTII,S$GLB,, | Performed by: NURSE PRACTITIONER

## 2022-11-17 PROCEDURE — 4010F PR ACE/ARB THEARPY RXD/TAKEN: ICD-10-PCS | Mod: CPTII,S$GLB,, | Performed by: NURSE PRACTITIONER

## 2022-11-17 PROCEDURE — 3080F PR MOST RECENT DIASTOLIC BLOOD PRESSURE >= 90 MM HG: ICD-10-PCS | Mod: CPTII,S$GLB,, | Performed by: NURSE PRACTITIONER

## 2022-11-17 PROCEDURE — 4010F ACE/ARB THERAPY RXD/TAKEN: CPT | Mod: CPTII,S$GLB,, | Performed by: NURSE PRACTITIONER

## 2022-11-17 PROCEDURE — 99999 PR PBB SHADOW E&M-EST. PATIENT-LVL III: ICD-10-PCS | Mod: PBBFAC,,, | Performed by: NURSE PRACTITIONER

## 2022-11-17 RX ORDER — SPIRONOLACTONE 25 MG/1
25 TABLET ORAL DAILY
Qty: 90 TABLET | Refills: 3 | Status: SHIPPED | OUTPATIENT
Start: 2022-11-17 | End: 2023-02-28 | Stop reason: SDUPTHER

## 2022-11-17 RX ORDER — FUROSEMIDE 20 MG/1
20 TABLET ORAL EVERY OTHER DAY
Qty: 45 TABLET | Refills: 3 | Status: SHIPPED | OUTPATIENT
Start: 2022-11-17 | End: 2023-01-30 | Stop reason: SDUPTHER

## 2022-11-17 NOTE — PROGRESS NOTES
Subjective:    Patient ID:  Riana Beltran is a 61 y.o. male patient here for evaluation Results (ECHO), Hypertension, and Atrial Fibrillation      History of Present Illness:       Mr. Beltran is here today for his follow up visit. He had a recent ECHO shows an LVEF 30%. He denies CP. He did quit smoking. He stopped drinking is only drinking wine. Denies recent fever or chills. NO arm neck or jaw pain. No lightheaded or dizziness. Wants to exercise. Does not have lifevest. He denies recent palpitations.       Review of patient's allergies indicates:  No Known Allergies    Past Medical History:   Diagnosis Date    Back pain     Bilateral radial fractures     Broken ribs 2020    Neck pain      Past Surgical History:   Procedure Laterality Date    ANKLE SURGERY Right     APPENDECTOMY      KNEE SURGERY Left     patella realignment    LATERAL EPICONDYLE RELEASE Right 3/11/2020    Procedure: RELEASE, ELBOW, LATERAL EPICONDYLE;  Surgeon: Saul Wynn MD;  Location: [facility] OR;  Service: Orthopedics;  Laterality: Right;    LEFT HEART CATHETERIZATION Left 2022    Procedure: Left heart cath;  Surgeon: Jamel Dejesus MD;  Location: [facility] CATH/EP LAB;  Service: Cardiology;  Laterality: Left;    NECK SURGERY      laminectomy    OPEN REDUCTION AND INTERNAL FIXATION (ORIF) OF FRACTURE OF RADIUS Right 3/11/2020    Procedure: ORIF, FRACTURE, RADIUS;  Surgeon: Saul Wynn MD;  Location: [facility] OR;  Service: Orthopedics;  Laterality: Right;  Synthes, needs compression screws BENJAMIN SANTIAGO NOTIFIED    OSTEOTOMY OF ZYGOMATIC BONE AND ORBIT      TONSILLECTOMY       Social History     Tobacco Use    Smoking status: Former     Packs/day: 1.00     Years: 10.00     Pack years: 10.00     Types: Cigarettes     Quit date: 2022     Years since quittin.2    Smokeless tobacco: Never   Substance Use Topics    Alcohol use: Not Currently    Drug use: Never        Review of Systems   As noted in HPI in addition             Objective        Vitals:    11/17/22 0828   BP: (!) 130/90   Pulse: 60       LIPIDS - LAST 2   No results found for: CHOL, HDL, LDLCALC, TRIG, CHOLHDL    CBC - LAST 2  Lab Results   Component Value Date    WBC 7.84 09/12/2022    WBC 9.15 03/04/2020    RBC 5.26 09/12/2022    RBC 4.91 03/04/2020    HGB 16.3 09/12/2022    HGB 15.4 03/04/2020    HCT 47.8 09/12/2022    HCT 45.3 03/04/2020    MCV 91 09/12/2022    MCV 92 03/04/2020    MCH 31.0 09/12/2022    MCH 31.4 (H) 03/04/2020    MCHC 34.1 09/12/2022    MCHC 34.0 03/04/2020    RDW 11.9 09/12/2022    RDW 11.7 03/04/2020     09/12/2022     03/04/2020    MPV 10.4 09/12/2022    MPV 10.9 03/04/2020    GRAN 4.0 09/12/2022    GRAN 50.8 09/12/2022    LYMPH 2.5 09/12/2022    LYMPH 32.1 09/12/2022    MONO 0.7 09/12/2022    MONO 9.4 09/12/2022    BASO 0.05 09/12/2022    BASO 0.06 03/04/2020    NRBC 0 09/12/2022    NRBC 0 03/04/2020       CHEMISTRY & LIVER FUNCTION - LAST 2  Lab Results   Component Value Date     09/12/2022     09/12/2022    K 4.2 09/12/2022    K 4.2 09/12/2022     09/12/2022     09/12/2022    CO2 24 09/12/2022    CO2 24 09/12/2022    ANIONGAP 12 09/12/2022    ANIONGAP 12 09/12/2022    BUN 14 09/12/2022    BUN 14 09/12/2022    CREATININE 0.6 09/12/2022    CREATININE 0.6 09/12/2022     (H) 09/12/2022     (H) 09/12/2022    CALCIUM 9.7 09/12/2022    CALCIUM 9.7 09/12/2022    ALBUMIN 4.4 03/04/2020    PROT 7.7 03/04/2020    ALKPHOS 96 03/04/2020    ALT 69 (H) 03/04/2020    AST 63 (H) 03/04/2020    BILITOT 1.1 (H) 03/04/2020        CARDIAC PROFILE - LAST 2  No results found for: BNP, CPK, CPKMB, LDH, TROPONINI, TROPONINIHS     COAGULATION - LAST 2  Lab Results   Component Value Date    APTT 34.8 09/12/2022       ENDOCRINE & PSA - LAST 2  No results found for: HGBA1C, MICROALBUR, TSH, PROCAL, PSA     ECHOCARDIOGRAM RESULTS  Results for orders placed during the hospital encounter of  11/10/22    Echo    Interpretation Summary  · The left ventricle is mildly enlarged with concentric remodeling and moderately decreased systolic function.  · The estimated ejection fraction is 30%.  · Grade I left ventricular diastolic dysfunction.  · There is left ventricular global hypokinesis.  · Normal right ventricular size with normal right ventricular systolic function.  · Mild left atrial enlargement.  · Mild mitral regurgitation.  · Mild tricuspid regurgitation.  · Normal central venous pressure (3 mmHg).  · The estimated PA systolic pressure is 24 mmHg.      CURRENT/PREVIOUS VISIT EKG  Results for orders placed or performed during the hospital encounter of 09/12/22   EKG 12-lead    Collection Time: 09/12/22  3:24 PM    Narrative    Test Reason :     Vent. Rate : 072 BPM     Atrial Rate : 072 BPM     P-R Int : 160 ms          QRS Dur : 110 ms      QT Int : 424 ms       P-R-T Axes : 216 191 172 degrees     QTc Int : 464 ms     Suspect arm lead reversal, interpretation assumes no reversal  Unusual P axis, possible ectopic atrial rhythm with occasional Premature  ventricular complexes  Right superior axis deviation  Inferior infarct ,age undetermined  Abnormal ECG  When compared with ECG of 24-AUG-2022 15:55,  Ectopic atrial rhythm has replaced Sinus rhythm  The axis Shifted left  Inferior infarct is now Present  Nonspecific T wave abnormality has replaced inverted T waves in Lateral  leads  Confirmed by Sreekanth German MD ([id number]) on 9/19/2022 9:41:55 AM    Referred By:             Confirmed By:Sreekanth German MD     No valid procedures specified.   Results for orders placed during the hospital encounter of 08/29/22    Nuclear Stress - Cardiology Interpreted    Interpretation Summary    Abnormal myocardial perfusion scan.    There is a moderate intensity, small to moderate sized, reversible perfusion abnormality that is consistent with ischemia in the inferolateral wall(s).    There are no other significant  perfusion abnormalities.    The gated perfusion images showed an ejection fraction of 25% post stress. Normal ejection fraction is greater than 47%.    There is  and severe global hypokinesis at stress .    LV cavity size is  and moderately enlarged at stress.    The EKG portion of this study is negative for ischemia.    The patient reported no chest pain during the stress test.    During stress, occasional PVCs are noted.    CATH    The ejection fraction was calculated to be 30%.    There was moderate to severe left ventricular systolic dysfunction.    The left ventricular end diastolic pressure was normal.    The pre-procedure left ventricular end diastolic pressure was 6.    The post-procedure left ventricular end diastolic pressure was 7.    The estimated blood loss was none.    The coronary arteries were normal..         PHYSICAL EXAM    GENERAL: well built, well nourished, well-developed in no apparent distress alert and oriented.   HEENT: Normocephalic. Pupils normal and conjunctivae normal.  Mucous membranes normal, no cyanosis or icterus, trachea central,no pallor or icterus is noted..   NECK: No JVD. No bruit..   THYROID: Thyroid not enlarged. No nodules present..   CARDIAC: Regular rate and rhythm. S1 is normal.S2 is normal.No gallops, clicks or murmurs noted at this time.  CHEST ANATOMY: normal.   LUNGS: Clear to auscultation. No wheezing or rhonchi..   ABDOMEN: Soft no masses or organomegaly.  No abdomen pulsations or bruits.  Normal bowel sounds. No pulsations and no masses felt, No guarding or rebound.   EXTREMITIES: No cyanosis, clubbing or edema noted at this time., no calf tenderness bilaterally.   PERIPHERAL VASCULAR SYSTEM: Good palpable distal pulses.   CENTRAL NERVOUS SYSTEM: No focal motor or sensory deficits noted.   SKIN: Skin without lesions, moist, well perfused.   MUSCLE STRENGTH & TONE: No noteable weakness, atrophy or abnormal movement.     I HAVE REVIEWED :    The vital signs, nurses  notes, and all the pertinent radiology and labs.        Current Outpatient Medications   Medication Instructions    apixaban (ELIQUIS) 5 mg, Oral, 2 times daily    carvediloL (COREG) 6.25 mg, Oral, 2 times daily    furosemide (LASIX) 20 mg, Oral, Every other day    ibuprofen (ADVIL,MOTRIN) 200 mg, Oral, Every 6 hours PRN    magnesium oxide (MAG-OX) 400 mg, Oral, Daily    sacubitriL-valsartan (ENTRESTO) 24-26 mg per tablet 1 tablet, Oral, 2 times daily    spironolactone (ALDACTONE) 25 mg, Oral, Daily          Assessment & Plan     Paroxysmal atrial fibrillation  Currently in NSR  Continue Eliquis and Coreg  Hold Eliquis 3 days prior to procedure.    Essential hypertension  Continue current regimen  Coreg 6.25 mg PO BID  Entresto 24/26 mg PO BID  Aldactone and Lasix at current regimen  Consider rechecking labs to include CMP and BNP With preop labs    LV dysfunction      The left ventricle is mildly enlarged with concentric remodeling and moderately decreased systolic function.  The estimated ejection fraction is 30%.  Grade I left ventricular diastolic dysfunction.  There is left ventricular global hypokinesis.  Normal right ventricular size with normal right ventricular systolic function.  Mild left atrial enlargement.  Mild mitral regurgitation.  Mild tricuspid regurgitation.  Normal central venous pressure (3 mmHg).  The estimated PA systolic pressure is 24 mmHg.      Patient repeat ECHO shows an LVEF 30%.   Will plan for an ICD implantation  Risks and benefits of the procedure including, but not limited to the risk of Infection, Bleeding, Pericardial Effusion, Pneumothorax, and even death has been explained to the patient.  Informed consent obtained.   He will need to hold NOAC 3 Days prior   Patient in NSR  QRS <120ms  Will plan for Dual chamber ICD    Systolic congestive heart failure  Euvolemic on exam   Continue the same    Echo    Interpretation Summary  · The left ventricle is mildly enlarged with  concentric remodeling and moderately decreased systolic function.  · The estimated ejection fraction is 30%.  · Grade I left ventricular diastolic dysfunction.  · There is left ventricular global hypokinesis.  · Normal right ventricular size with normal right ventricular systolic function.  · Mild left atrial enlargement.  · Mild mitral regurgitation.  · Mild tricuspid regurgitation.  · Normal central venous pressure (3 mmHg).  · The estimated PA systolic pressure is 24 mmHg.  . Continue Beta Blocker, Furosemide, Aldactone and ARNI and monitor clinical status closely. Monitor on telemetry. Patient is on CHF pathway.  Monitor strict Is&Os and daily weights.  Place on fluid restriction of 2 L. Continue to stress to patient importance of self efficacy and  on diet for CHF.          No follow-ups on file.

## 2022-11-17 NOTE — ASSESSMENT & PLAN NOTE
Euvolemic on exam   Continue the same    Echo    Interpretation Summary  · The left ventricle is mildly enlarged with concentric remodeling and moderately decreased systolic function.  · The estimated ejection fraction is 30%.  · Grade I left ventricular diastolic dysfunction.  · There is left ventricular global hypokinesis.  · Normal right ventricular size with normal right ventricular systolic function.  · Mild left atrial enlargement.  · Mild mitral regurgitation.  · Mild tricuspid regurgitation.  · Normal central venous pressure (3 mmHg).  · The estimated PA systolic pressure is 24 mmHg.  . Continue Beta Blocker, Furosemide, Aldactone and ARNI and monitor clinical status closely. Monitor on telemetry. Patient is on CHF pathway.  Monitor strict Is&Os and daily weights.  Place on fluid restriction of 2 L. Continue to stress to patient importance of self efficacy and  on diet for CHF.

## 2022-11-17 NOTE — ASSESSMENT & PLAN NOTE
Continue current regimen  Coreg 6.25 mg PO BID  Entresto 24/26 mg PO BID  Aldactone and Lasix at current regimen  Consider rechecking labs to include CMP and BNP With preop labs

## 2022-11-17 NOTE — ASSESSMENT & PLAN NOTE
     The left ventricle is mildly enlarged with concentric remodeling and moderately decreased systolic function.   The estimated ejection fraction is 30%.   Grade I left ventricular diastolic dysfunction.   There is left ventricular global hypokinesis.   Normal right ventricular size with normal right ventricular systolic function.   Mild left atrial enlargement.   Mild mitral regurgitation.   Mild tricuspid regurgitation.   Normal central venous pressure (3 mmHg).   The estimated PA systolic pressure is 24 mmHg.      Patient repeat ECHO shows an LVEF 30%.   Will plan for an ICD implantation  Risks and benefits of the procedure including, but not limited to the risk of Infection, Bleeding, Pericardial Effusion, Pneumothorax, and even death has been explained to the patient.  Informed consent obtained.   He will need to hold NOAC 3 Days prior   Patient in NSR  QRS <120ms  Will plan for Dual chamber ICD

## 2022-11-23 ENCOUNTER — NURSE TRIAGE (OUTPATIENT)
Dept: ADMINISTRATIVE | Facility: CLINIC | Age: 62
End: 2022-11-23
Payer: COMMERCIAL

## 2022-11-23 ENCOUNTER — PATIENT MESSAGE (OUTPATIENT)
Dept: CARDIOLOGY | Facility: CLINIC | Age: 62
End: 2022-11-23
Payer: COMMERCIAL

## 2022-11-24 NOTE — TELEPHONE ENCOUNTER
Patient is cardiovascular patient with questions of what meds he can take for congestion. He will check with his pharmacist for advice but would like to have the advice of  his cardiologist.  Reason for Disposition   [1] Caller requesting NON-URGENT health information AND [2] PCP's office is the best resource    Additional Information   Negative: [1] Caller is not with the adult (patient) AND [2] reporting urgent symptoms   Negative: Lab result questions   Negative: Medication questions   Negative: Caller can't be reached by phone   Negative: Caller has already spoken to PCP or another triager   Negative: RN needs further essential information from caller in order to complete triage   Negative: Requesting regular office appointment    Protocols used: Information Only Call - No Triage-A-

## 2022-12-01 NOTE — TELEPHONE ENCOUNTER
Called pt. And relayed information from Aniya to pt.  All questions answered and verbalized understanding.

## 2022-12-02 ENCOUNTER — HOSPITAL ENCOUNTER (EMERGENCY)
Facility: HOSPITAL | Age: 62
Discharge: HOME | End: 2022-12-02
Attending: EMERGENCY MEDICINE
Payer: COMMERCIAL

## 2022-12-02 VITALS
BODY MASS INDEX: 32.55 KG/M2 | SYSTOLIC BLOOD PRESSURE: 129 MMHG | HEART RATE: 60 BPM | OXYGEN SATURATION: 96 % | DIASTOLIC BLOOD PRESSURE: 76 MMHG | TEMPERATURE: 98 F | RESPIRATION RATE: 20 BRPM | WEIGHT: 240 LBS

## 2022-12-02 DIAGNOSIS — K80.20 CALCULUS OF GALLBLADDER WITHOUT CHOLECYSTITIS WITHOUT OBSTRUCTION: ICD-10-CM

## 2022-12-02 DIAGNOSIS — R10.13 EPIGASTRIC ABDOMINAL PAIN: Primary | ICD-10-CM

## 2022-12-02 LAB
ALBUMIN SERPL BCP-MCNC: 4.3 G/DL (ref 3.5–5.2)
ALP SERPL-CCNC: 91 U/L (ref 55–135)
ALT SERPL W/O P-5'-P-CCNC: 40 U/L (ref 10–44)
ANION GAP SERPL CALC-SCNC: 8 MMOL/L (ref 8–16)
AST SERPL-CCNC: 26 U/L (ref 10–40)
BASOPHILS # BLD AUTO: 0.03 K/UL (ref 0–0.2)
BASOPHILS NFR BLD: 0.4 % (ref 0–1.9)
BILIRUB SERPL-MCNC: 0.7 MG/DL (ref 0.1–1)
BUN SERPL-MCNC: 14 MG/DL (ref 6–20)
CALCIUM SERPL-MCNC: 9 MG/DL (ref 8.7–10.5)
CHLORIDE SERPL-SCNC: 104 MMOL/L (ref 95–110)
CO2 SERPL-SCNC: 24 MMOL/L (ref 23–29)
CREAT SERPL-MCNC: 0.6 MG/DL (ref 0.5–1.4)
DIFFERENTIAL METHOD: ABNORMAL
EOSINOPHIL # BLD AUTO: 0.4 K/UL (ref 0–0.5)
EOSINOPHIL NFR BLD: 5.9 % (ref 0–8)
ERYTHROCYTE [DISTWIDTH] IN BLOOD BY AUTOMATED COUNT: 12.3 % (ref 11.5–14.5)
EST. GFR  (NO RACE VARIABLE): >60 ML/MIN/1.73 M^2
GLUCOSE SERPL-MCNC: 142 MG/DL (ref 70–110)
HCT VFR BLD AUTO: 39 % (ref 40–54)
HGB BLD-MCNC: 13.2 G/DL (ref 14–18)
IMM GRANULOCYTES # BLD AUTO: 0.02 K/UL (ref 0–0.04)
IMM GRANULOCYTES NFR BLD AUTO: 0.3 % (ref 0–0.5)
LIPASE SERPL-CCNC: 32 U/L (ref 4–60)
LYMPHOCYTES # BLD AUTO: 2 K/UL (ref 1–4.8)
LYMPHOCYTES NFR BLD: 27.5 % (ref 18–48)
MCH RBC QN AUTO: 31.4 PG (ref 27–31)
MCHC RBC AUTO-ENTMCNC: 33.8 G/DL (ref 32–36)
MCV RBC AUTO: 93 FL (ref 82–98)
MONOCYTES # BLD AUTO: 0.7 K/UL (ref 0.3–1)
MONOCYTES NFR BLD: 9 % (ref 4–15)
NEUTROPHILS # BLD AUTO: 4.2 K/UL (ref 1.8–7.7)
NEUTROPHILS NFR BLD: 56.9 % (ref 38–73)
NRBC BLD-RTO: 0 /100 WBC
PLATELET # BLD AUTO: 260 K/UL (ref 150–450)
PMV BLD AUTO: 9.9 FL (ref 9.2–12.9)
POTASSIUM SERPL-SCNC: 4.1 MMOL/L (ref 3.5–5.1)
PROT SERPL-MCNC: 7 G/DL (ref 6–8.4)
RBC # BLD AUTO: 4.21 M/UL (ref 4.6–6.2)
SODIUM SERPL-SCNC: 136 MMOL/L (ref 136–145)
WBC # BLD AUTO: 7.35 K/UL (ref 3.9–12.7)

## 2022-12-02 PROCEDURE — 80053 COMPREHEN METABOLIC PANEL: CPT | Performed by: EMERGENCY MEDICINE

## 2022-12-02 PROCEDURE — 85025 COMPLETE CBC W/AUTO DIFF WBC: CPT | Performed by: EMERGENCY MEDICINE

## 2022-12-02 PROCEDURE — 83690 ASSAY OF LIPASE: CPT | Performed by: EMERGENCY MEDICINE

## 2022-12-02 PROCEDURE — 99285 EMERGENCY DEPT VISIT HI MDM: CPT | Mod: 25

## 2022-12-02 PROCEDURE — 63600175 PHARM REV CODE 636 W HCPCS: Performed by: EMERGENCY MEDICINE

## 2022-12-02 PROCEDURE — 96374 THER/PROPH/DIAG INJ IV PUSH: CPT

## 2022-12-02 RX ORDER — HYDROMORPHONE HYDROCHLORIDE 1 MG/ML
0.5 INJECTION, SOLUTION INTRAMUSCULAR; INTRAVENOUS; SUBCUTANEOUS
Status: COMPLETED | OUTPATIENT
Start: 2022-12-02 | End: 2022-12-02

## 2022-12-02 RX ADMIN — HYDROMORPHONE HYDROCHLORIDE 0.5 MG: 0.5 INJECTION, SOLUTION INTRAMUSCULAR; INTRAVENOUS; SUBCUTANEOUS at 03:12

## 2022-12-03 NOTE — ED PROVIDER NOTES
Encounter Date: 12/2/2022       History     Chief Complaint   Patient presents with    Abdominal Pain     Right upper quad pain x 3 hours.      HPI    Seen and evaluated.  Presented with chief complaint right upper quadrant pain.  He reports that it began acutely 3 hours ago.  He denies any known irritants, states he had been told previously had some problems with his gallbladder possibly, but denies associated fever chills.  The pain began acutely.  It is moderate to severe in intensity.  It is focal in the right upper quadrant, and not generalized.  He denies any alleviation and denies any known provoking factors.  Symptoms are moderate to severe and ongoing.          Review of patient's allergies indicates:  No Known Allergies  Past Medical History:   Diagnosis Date    Back pain 2020    Bilateral radial fractures 2020    Broken ribs 02/29/2020    Neck pain 2020     Past Surgical History:   Procedure Laterality Date    ANKLE SURGERY Right     APPENDECTOMY      KNEE SURGERY Left     patella realignment    LATERAL EPICONDYLE RELEASE Right 3/11/2020    Procedure: RELEASE, ELBOW, LATERAL EPICONDYLE;  Surgeon: Saul Wynn MD;  Location: [facility] OR;  Service: Orthopedics;  Laterality: Right;    LEFT HEART CATHETERIZATION Left 9/14/2022    Procedure: Left heart cath;  Surgeon: Jamel Dejesus MD;  Location: [facility] CATH/EP LAB;  Service: Cardiology;  Laterality: Left;    NECK SURGERY      laminectomy    OPEN REDUCTION AND INTERNAL FIXATION (ORIF) OF FRACTURE OF RADIUS Right 3/11/2020    Procedure: ORIF, FRACTURE, RADIUS;  Surgeon: Saul Wynn MD;  Location: [facility] OR;  Service: Orthopedics;  Laterality: Right;  Synthes, needs compression screws BENJAMIN SANTIAGO NOTIFIED    OSTEOTOMY OF ZYGOMATIC BONE AND ORBIT      TONSILLECTOMY       No family history on file.  Social History     Tobacco Use    Smoking status: Former     Packs/day: 1.00     Years: 10.00     Pack years: 10.00     Types: Cigarettes     Quit date: 8/19/2022      Years since quittin.2    Smokeless tobacco: Never   Substance Use Topics    Alcohol use: Not Currently    Drug use: Never     Review of Systems   Constitutional:  Negative for fever.   HENT:  Negative for sore throat.    Respiratory:  Negative for shortness of breath.    Cardiovascular:  Negative for chest pain.   Gastrointestinal:  Positive for abdominal pain. Negative for nausea.   Genitourinary:  Negative for dysuria.   Neurological:  Negative for weakness.   Hematological:  Does not bruise/bleed easily.   Psychiatric/Behavioral:  Negative for confusion.    All other systems reviewed and are negative.    Physical Exam     Initial Vitals [22 0242]   BP Pulse Resp Temp SpO2   (!) 182/108 60 20 97.6 °F (36.4 °C) 96 %      MAP       --         Physical Exam    Nursing note and vitals reviewed.  Constitutional: He appears well-developed and well-nourished.   HENT:   Head: Normocephalic and atraumatic.   Eyes: Conjunctivae are normal.   Cardiovascular:  Normal rate and regular rhythm.           Pulmonary/Chest: Effort normal.   Abdominal: Abdomen is soft.   Musculoskeletal:         General: Normal range of motion.     Neurological: He is alert and oriented to person, place, and time.   Skin: Skin is warm and dry.   Psychiatric: He has a normal mood and affect. His speech is normal.       ED Course   Procedures  Labs Reviewed   CBC W/ AUTO DIFFERENTIAL - Abnormal; Notable for the following components:       Result Value    RBC 4.21 (*)     Hemoglobin 13.2 (*)     Hematocrit 39.0 (*)     MCH 31.4 (*)     All other components within normal limits   COMPREHENSIVE METABOLIC PANEL - Abnormal; Notable for the following components:    Glucose 142 (*)     All other components within normal limits   LIPASE          Imaging Results              US Abdomen Limited (Final result)  Result time 22 04:15:34      Final result by Justino Monique MD (22 04:15:34)                   Narrative:    EXAM DESCRIPTION: US  ABDOMEN LIMITED    CLINICAL HISTORY: 61 years Male, abdominal pain    COMPARISON: None.    TECHNIQUE: Sonographic imaging of the right upper quadrant was performed.    FINDINGS:    Liver measures 23.4 cm longitudinally and demonstrates increased echogenicity compatible with fatty changes. Normal directional flow in the main portal vein is demonstrated.    Several small shadowing stones in the gallbladder are demonstrated. No evidence of gallbladder wall thickening. Common bile duct measures 4 mm in width and is normal.    Right kidney measures 12.8 cm x 6.3 cm x 6.6 cm. No evidence of right-sided hydronephrosis or renal stones. Echogenicity appears normal.    Visualized pancreas appears unremarkable.    Visualized IVC and aorta appear unremarkable.    IMPRESSION:    1. Simple cholelithiasis.  2. Hepatomegaly and hepatic steatosis.    Electronically signed by:  Justino Monique MD  12/2/2022 4:15 AM [facility] Workstation: [id number]                                     Medications   HYDROmorphone injection 0.5 mg (0.5 mg Intravenous Given 12/2/22 0322)     Medical Decision Making:   Initial Assessment:   Seen and evaluated.  Acute abdominal pain.  Pain in the right upper quadrant.  Symptomatically improved after pain control.  Ultrasound answers cholelithiasis without evidence of cholecystitis.  Patient's symptoms have resolved.  Currently appears stable.  Will discharge with outpatient follow-up.                        Clinical Impression:   Final diagnoses:  [R10.13] Epigastric abdominal pain (Primary)  [K80.20] Calculus of gallbladder without cholecystitis without obstruction        ED Disposition Condition    Discharge Stable          ED Prescriptions    None       Follow-up Information       Follow up With Specialties Details Why Contact Info Additional Information    [facility] - Emergency Dept Emergency Medicine   [address] [place] [address] [phone] 1st floor    Antonio Vazquez MD  General Surgery   [address] [facility]  [address]  [facility] [address]  [phone]                Eduard Velarde Jr., MD  12/02/22 6712

## 2022-12-08 ENCOUNTER — HOSPITAL ENCOUNTER (OUTPATIENT)
Dept: PREADMISSION TESTING | Facility: HOSPITAL | Age: 62
Discharge: HOME | End: 2022-12-08
Attending: INTERNAL MEDICINE
Payer: COMMERCIAL

## 2022-12-08 DIAGNOSIS — Z01.810 PRE-PROCEDURAL CARDIOVASCULAR EXAMINATION: Primary | ICD-10-CM

## 2022-12-08 LAB
ALBUMIN SERPL BCP-MCNC: 4.7 G/DL (ref 3.5–5.2)
ALP SERPL-CCNC: 85 U/L (ref 55–135)
ALT SERPL W/O P-5'-P-CCNC: 32 U/L (ref 10–44)
ANION GAP SERPL CALC-SCNC: 12 MMOL/L (ref 8–16)
APTT PPP: 34.9 SEC (ref 23.3–35.1)
AST SERPL-CCNC: 23 U/L (ref 10–40)
BILIRUB SERPL-MCNC: 1.1 MG/DL (ref 0.1–1)
BUN SERPL-MCNC: 13 MG/DL (ref 6–20)
CALCIUM SERPL-MCNC: 9.5 MG/DL (ref 8.7–10.5)
CHLORIDE SERPL-SCNC: 98 MMOL/L (ref 95–110)
CO2 SERPL-SCNC: 24 MMOL/L (ref 23–29)
CREAT SERPL-MCNC: 0.6 MG/DL (ref 0.5–1.4)
ERYTHROCYTE [DISTWIDTH] IN BLOOD BY AUTOMATED COUNT: 12.3 % (ref 11.5–14.5)
EST. GFR  (NO RACE VARIABLE): >60 ML/MIN/1.73 M^2
GLUCOSE SERPL-MCNC: 110 MG/DL (ref 70–110)
HCT VFR BLD AUTO: 41.3 % (ref 40–54)
HGB BLD-MCNC: 14.4 G/DL (ref 14–18)
INR PPP: 1.1
MAGNESIUM SERPL-MCNC: 2.2 MG/DL (ref 1.6–2.6)
MCH RBC QN AUTO: 31.5 PG (ref 27–31)
MCHC RBC AUTO-ENTMCNC: 34.9 G/DL (ref 32–36)
MCV RBC AUTO: 90 FL (ref 82–98)
PLATELET # BLD AUTO: 330 K/UL (ref 150–450)
PMV BLD AUTO: 10.5 FL (ref 9.2–12.9)
POTASSIUM SERPL-SCNC: 4.2 MMOL/L (ref 3.5–5.1)
PROT SERPL-MCNC: 7.9 G/DL (ref 6–8.4)
PROTHROMBIN TIME: 13.5 SEC (ref 11.4–13.7)
RBC # BLD AUTO: 4.57 M/UL (ref 4.6–6.2)
SODIUM SERPL-SCNC: 134 MMOL/L (ref 136–145)
WBC # BLD AUTO: 8.11 K/UL (ref 3.9–12.7)

## 2022-12-08 PROCEDURE — 93005 ELECTROCARDIOGRAM TRACING: CPT | Performed by: INTERNAL MEDICINE

## 2022-12-08 PROCEDURE — 87641 MR-STAPH DNA AMP PROBE: CPT | Performed by: INTERNAL MEDICINE

## 2022-12-08 PROCEDURE — 93010 EKG 12-LEAD: ICD-10-PCS | Mod: ,,, | Performed by: INTERNAL MEDICINE

## 2022-12-08 PROCEDURE — 85730 THROMBOPLASTIN TIME PARTIAL: CPT | Performed by: INTERNAL MEDICINE

## 2022-12-08 PROCEDURE — 93010 ELECTROCARDIOGRAM REPORT: CPT | Mod: ,,, | Performed by: INTERNAL MEDICINE

## 2022-12-08 PROCEDURE — 36415 COLL VENOUS BLD VENIPUNCTURE: CPT | Performed by: INTERNAL MEDICINE

## 2022-12-08 PROCEDURE — 85610 PROTHROMBIN TIME: CPT | Performed by: INTERNAL MEDICINE

## 2022-12-08 PROCEDURE — 80053 COMPREHEN METABOLIC PANEL: CPT | Performed by: INTERNAL MEDICINE

## 2022-12-08 PROCEDURE — 85027 COMPLETE CBC AUTOMATED: CPT | Performed by: INTERNAL MEDICINE

## 2022-12-08 PROCEDURE — 83735 ASSAY OF MAGNESIUM: CPT | Performed by: INTERNAL MEDICINE

## 2022-12-08 NOTE — DISCHARGE INSTRUCTIONS
Nothing to eat or drink after midnight the night before your procedure.  Do not take any medications the morning of your procedure  Bring all your medications with you in the original pill bottles from pharmacy.  If you take blood thinners, ask your doctor when you should stop taking them.  Do your Hibiclens wash the night before and morning of your procedure.  If you use a CPAP or BiPAP at home, please bring it with you the day of your procedure.  Make arrangements for someone you know to drive you home after your procedure. Taxi and Uber are not acceptable.

## 2022-12-09 LAB — MRSA SCREEN BY PCR: NEGATIVE

## 2022-12-13 ENCOUNTER — HOSPITAL ENCOUNTER (OUTPATIENT)
Facility: HOSPITAL | Age: 62
Discharge: HOME | End: 2022-12-14
Attending: INTERNAL MEDICINE | Admitting: INTERNAL MEDICINE
Payer: COMMERCIAL

## 2022-12-13 DIAGNOSIS — Z01.810 PREOP CARDIOVASCULAR EXAM: ICD-10-CM

## 2022-12-13 DIAGNOSIS — I42.8 NONISCHEMIC CARDIOMYOPATHY: ICD-10-CM

## 2022-12-13 DIAGNOSIS — I50.9 CHF (CONGESTIVE HEART FAILURE): ICD-10-CM

## 2022-12-13 DIAGNOSIS — Z95.810 AICD (AUTOMATIC CARDIOVERTER/DEFIBRILLATOR) PRESENT: ICD-10-CM

## 2022-12-13 DIAGNOSIS — I49.9 ARRHYTHMIA: ICD-10-CM

## 2022-12-13 PROCEDURE — C1777 LEAD, AICD, ENDO SINGLE COIL: HCPCS | Performed by: INTERNAL MEDICINE

## 2022-12-13 PROCEDURE — C1882 AICD, OTHER THAN SING/DUAL: HCPCS | Performed by: INTERNAL MEDICINE

## 2022-12-13 PROCEDURE — 93010 EKG 12-LEAD: ICD-10-PCS | Mod: ,,, | Performed by: INTERNAL MEDICINE

## 2022-12-13 PROCEDURE — 99153 MOD SED SAME PHYS/QHP EA: CPT | Performed by: INTERNAL MEDICINE

## 2022-12-13 PROCEDURE — 33249 PR ICD INSERT SNGL/DUAL W/LEADS: ICD-10-PCS | Mod: ,,, | Performed by: INTERNAL MEDICINE

## 2022-12-13 PROCEDURE — 25000003 PHARM REV CODE 250: Performed by: INTERNAL MEDICINE

## 2022-12-13 PROCEDURE — C1894 INTRO/SHEATH, NON-LASER: HCPCS | Performed by: INTERNAL MEDICINE

## 2022-12-13 PROCEDURE — 27201423 OPTIME MED/SURG SUP & DEVICES STERILE SUPPLY: Performed by: INTERNAL MEDICINE

## 2022-12-13 PROCEDURE — 33249 INSJ/RPLCMT DEFIB W/LEAD(S): CPT | Mod: ,,, | Performed by: INTERNAL MEDICINE

## 2022-12-13 PROCEDURE — 33249 INSJ/RPLCMT DEFIB W/LEAD(S): CPT | Performed by: INTERNAL MEDICINE

## 2022-12-13 PROCEDURE — 63600175 PHARM REV CODE 636 W HCPCS: Performed by: INTERNAL MEDICINE

## 2022-12-13 PROCEDURE — 99152 MOD SED SAME PHYS/QHP 5/>YRS: CPT | Performed by: INTERNAL MEDICINE

## 2022-12-13 PROCEDURE — 25000003 PHARM REV CODE 250: Performed by: NURSE PRACTITIONER

## 2022-12-13 PROCEDURE — 93005 ELECTROCARDIOGRAM TRACING: CPT | Mod: 59 | Performed by: INTERNAL MEDICINE

## 2022-12-13 PROCEDURE — C1898 LEAD, PMKR, OTHER THAN TRANS: HCPCS | Performed by: INTERNAL MEDICINE

## 2022-12-13 PROCEDURE — 99152 PR MOD CONSCIOUS SEDATION, SAME PHYS, 5+ YRS, FIRST 15 MIN: ICD-10-PCS | Mod: ,,, | Performed by: INTERNAL MEDICINE

## 2022-12-13 PROCEDURE — 99152 MOD SED SAME PHYS/QHP 5/>YRS: CPT | Mod: ,,, | Performed by: INTERNAL MEDICINE

## 2022-12-13 PROCEDURE — 93010 ELECTROCARDIOGRAM REPORT: CPT | Mod: ,,, | Performed by: INTERNAL MEDICINE

## 2022-12-13 DEVICE — ICD-DR DDMD3D4 PRIMO MRI US
Type: IMPLANTABLE DEVICE | Site: CHEST  WALL | Status: FUNCTIONAL
Brand: PRIMO MRI™ DR SURESCAN™

## 2022-12-13 DEVICE — LEAD 457445 MRI US BI RCMCRD MVC
Type: IMPLANTABLE DEVICE | Site: HEART | Status: FUNCTIONAL
Brand: CAPSURE SENSE MRI™ SURESCAN™

## 2022-12-13 RX ORDER — FUROSEMIDE 20 MG/1
20 TABLET ORAL EVERY OTHER DAY
Status: DISCONTINUED | OUTPATIENT
Start: 2022-12-14 | End: 2022-12-14 | Stop reason: HOSPADM

## 2022-12-13 RX ORDER — SPIRONOLACTONE 25 MG/1
25 TABLET ORAL DAILY
Status: DISCONTINUED | OUTPATIENT
Start: 2022-12-13 | End: 2022-12-14 | Stop reason: HOSPADM

## 2022-12-13 RX ORDER — LIDOCAINE HYDROCHLORIDE 10 MG/ML
INJECTION, SOLUTION EPIDURAL; INFILTRATION; INTRACAUDAL; PERINEURAL
Status: DISCONTINUED | OUTPATIENT
Start: 2022-12-13 | End: 2022-12-13 | Stop reason: HOSPADM

## 2022-12-13 RX ORDER — CARVEDILOL 6.25 MG/1
6.25 TABLET ORAL 2 TIMES DAILY
Status: DISCONTINUED | OUTPATIENT
Start: 2022-12-13 | End: 2022-12-14 | Stop reason: HOSPADM

## 2022-12-13 RX ORDER — TRAMADOL HYDROCHLORIDE 50 MG/1
50 TABLET ORAL ONCE
Status: COMPLETED | OUTPATIENT
Start: 2022-12-13 | End: 2022-12-13

## 2022-12-13 RX ORDER — VANCOMYCIN HCL IN 5 % DEXTROSE 1G/250ML
2000 PLASTIC BAG, INJECTION (ML) INTRAVENOUS ONCE
Status: COMPLETED | OUTPATIENT
Start: 2022-12-13 | End: 2022-12-13

## 2022-12-13 RX ORDER — CEFAZOLIN SODIUM 2 G/50ML
2 SOLUTION INTRAVENOUS
Status: DISCONTINUED | OUTPATIENT
Start: 2022-12-13 | End: 2022-12-13

## 2022-12-13 RX ORDER — MIDAZOLAM HYDROCHLORIDE 1 MG/ML
INJECTION INTRAMUSCULAR; INTRAVENOUS
Status: DISCONTINUED | OUTPATIENT
Start: 2022-12-13 | End: 2022-12-13 | Stop reason: HOSPADM

## 2022-12-13 RX ORDER — FENTANYL CITRATE 50 UG/ML
INJECTION, SOLUTION INTRAMUSCULAR; INTRAVENOUS
Status: DISCONTINUED | OUTPATIENT
Start: 2022-12-13 | End: 2022-12-13 | Stop reason: HOSPADM

## 2022-12-13 RX ORDER — LANOLIN ALCOHOL/MO/W.PET/CERES
400 CREAM (GRAM) TOPICAL DAILY
Status: DISCONTINUED | OUTPATIENT
Start: 2022-12-13 | End: 2022-12-14 | Stop reason: HOSPADM

## 2022-12-13 RX ORDER — MUPIROCIN 20 MG/G
OINTMENT TOPICAL
Status: DISCONTINUED | OUTPATIENT
Start: 2022-12-13 | End: 2022-12-13 | Stop reason: HOSPADM

## 2022-12-13 RX ORDER — ACETAMINOPHEN 325 MG/1
650 TABLET ORAL EVERY 6 HOURS PRN
Status: DISCONTINUED | OUTPATIENT
Start: 2022-12-13 | End: 2022-12-14 | Stop reason: HOSPADM

## 2022-12-13 RX ORDER — IBUPROFEN 200 MG
200 TABLET ORAL EVERY 6 HOURS PRN
Status: DISCONTINUED | OUTPATIENT
Start: 2022-12-13 | End: 2022-12-14 | Stop reason: HOSPADM

## 2022-12-13 RX ADMIN — Medication 400 MG: at 03:12

## 2022-12-13 RX ADMIN — VANCOMYCIN HYDROCHLORIDE 1750 MG: 500 INJECTION, POWDER, LYOPHILIZED, FOR SOLUTION INTRAVENOUS at 07:12

## 2022-12-13 RX ADMIN — SPIRONOLACTONE 25 MG: 25 TABLET ORAL at 03:12

## 2022-12-13 RX ADMIN — TRAMADOL HYDROCHLORIDE 50 MG: 50 TABLET, COATED ORAL at 07:12

## 2022-12-13 RX ADMIN — APIXABAN 5 MG: 5 TABLET, FILM COATED ORAL at 09:12

## 2022-12-13 RX ADMIN — IBUPROFEN 200 MG: 200 TABLET, FILM COATED ORAL at 09:12

## 2022-12-13 RX ADMIN — CARVEDILOL 6.25 MG: 6.25 TABLET, FILM COATED ORAL at 09:12

## 2022-12-13 RX ADMIN — ACETAMINOPHEN 650 MG: 325 TABLET ORAL at 09:12

## 2022-12-13 RX ADMIN — VANCOMYCIN HYDROCHLORIDE 2000 MG: 1 INJECTION, POWDER, LYOPHILIZED, FOR SOLUTION INTRAVENOUS at 07:12

## 2022-12-13 RX ADMIN — SACUBITRIL AND VALSARTAN 1 TABLET: 24; 26 TABLET, FILM COATED ORAL at 09:12

## 2022-12-13 NOTE — Clinical Note
20 ml of contrast were injected throughout the case. 80 mL of contrast was the total wasted during the case. 100 mL was the total amount used during the case.

## 2022-12-13 NOTE — NURSING
Pt admitted to room 3018.  Pt denies needs at this time.  Pt A/O x 4 able to voice needs.  Dressing to left chest wall clean dry and intact.  Sling to left arm.  Ice pack applied.  No acute distress noted.  Pt aware of bed rest orders.  Urinal within reach.  Bed alarm intact.

## 2022-12-13 NOTE — Clinical Note
The site was marked. The left chest was prepped. The site was prepped with ChloraPrep. The site was clipped. The patient was draped. The patient was positioned supine.

## 2022-12-14 VITALS
HEIGHT: 72 IN | OXYGEN SATURATION: 94 % | HEART RATE: 75 BPM | WEIGHT: 281.88 LBS | DIASTOLIC BLOOD PRESSURE: 81 MMHG | BODY MASS INDEX: 38.18 KG/M2 | RESPIRATION RATE: 17 BRPM | TEMPERATURE: 98 F | SYSTOLIC BLOOD PRESSURE: 132 MMHG

## 2022-12-14 PROCEDURE — 25000003 PHARM REV CODE 250: Performed by: NURSE PRACTITIONER

## 2022-12-14 RX ORDER — CEPHALEXIN 500 MG/1
500 CAPSULE ORAL EVERY 8 HOURS
Qty: 21 CAPSULE | Refills: 0 | Status: SHIPPED | OUTPATIENT
Start: 2022-12-14

## 2022-12-14 RX ADMIN — FUROSEMIDE 20 MG: 20 TABLET ORAL at 08:12

## 2022-12-14 RX ADMIN — ACETAMINOPHEN 650 MG: 325 TABLET ORAL at 06:12

## 2022-12-14 RX ADMIN — APIXABAN 5 MG: 5 TABLET, FILM COATED ORAL at 08:12

## 2022-12-14 RX ADMIN — Medication 400 MG: at 08:12

## 2022-12-14 RX ADMIN — SACUBITRIL AND VALSARTAN 1 TABLET: 24; 26 TABLET, FILM COATED ORAL at 08:12

## 2022-12-14 RX ADMIN — CARVEDILOL 6.25 MG: 6.25 TABLET, FILM COATED ORAL at 08:12

## 2022-12-14 RX ADMIN — SPIRONOLACTONE 25 MG: 25 TABLET ORAL at 08:12

## 2022-12-14 NOTE — DISCHARGE SUMMARY
[facility]  Discharge Note  Short Stay    Procedure(s) (LRB):  Insertion, ICD, Dual Chamber (Left)  Venogram, Cath Lab      OUTCOME: Condition has improved and patient is now ready for discharge.    DISPOSITION: Home or Self Care    FINAL DIAGNOSIS:  <principal problem not specified>    FOLLOWUP: In clinic    DISCHARGE INSTRUCTIONS:  No discharge procedures on file.      Clinical Reference Documents Added to Patient Instructions         Document    AUTOMATIC CARDIOVERTER DEFIBRILLATOR IMPLANTATION (ENGLISH)            TIME SPENT ON DISCHARGE: 25 minutes    Patient underwent ICD implantation and tolerated procedure well. Site check this am. Dressing remains C/D/I. No bleeding or oozing noted. Educated patient on post ICD implantation instructions including limitations, post op antibiotics, and follow up.

## 2022-12-14 NOTE — PLAN OF CARE
[facility]  Initial Discharge Assessment       Primary Care Provider: Primary Doctor No    Admission Diagnosis: Nonischemic cardiomyopathy [I42.8]  CHF (congestive heart failure) [I50.9]    Admission Date: 12/13/2022  Expected Discharge Date: 12/14/2022    CM met with patient at bedside.  Verified information on face sheet.  No dialysis, no coumadin, no nebulizer.  Patient reported that he is currently working on getting a pcp.  No needs identified at this time.  Patient reported that his spouse will transport him home upon discharge.    Discharge Barriers Identified: None    Payor: BLUE CROSS BLUE SHIELD / Plan: BLUE CONNECT / Product Type: HMO /     Extended Emergency Contact Information  Primary Emergency Contact: bj beltran   [place]  Mobile Phone: [phone]  Relation: Spouse   needed? No    Discharge Plan A: Home with family  Discharge Plan B: Home with family      [facility] [address] [facility] [address]  [address] [address]  Phone: [phone] Fax: [phone]      Initial Assessment (most recent)       Adult Discharge Assessment - 12/14/22 1203          Discharge Assessment    Assessment Type Discharge Planning Assessment     Confirmed/corrected address, phone number and insurance Yes     Confirmed Demographics Correct on Facesheet     Source of Information patient     Does patient/caregiver understand observation status --   n/a    Communicated KHUSHBOO with patient/caregiver Yes     Reason For Admission No active principal problem     People in Home spouse     Facility Arrived From: home     Do you expect to return to your current living situation? Yes     Do you have help at home or someone to help you manage your care at home? Yes     Who are your caregiver(s) and their phone number(s)? Bj Beltran (spouse) [phone]     Prior to hospitilization cognitive status: Unable to Assess     Current cognitive status:  Alert/Oriented     Walking or Climbing Stairs --   none    Dressing/Bathing --   none    Home Accessibility wheelchair accessible     Home Layout Able to live on 1st floor     Equipment Currently Used at Home none     Patient currently being followed by outpatient case management? No     Do you currently have service(s) that help you manage your care at home? No     Do you take prescription medications? Yes     Do you have prescription coverage? Yes     Coverage Payor:  BLUE CROSS BLUE SHIELD - BLUE CONNECT     Do you have any problems affording any of your prescribed medications? No     Is the patient taking medications as prescribed? yes     Who is going to help you get home at discharge? spouse     How do you get to doctors appointments? car, drives self     Are you on dialysis? No     Do you take coumadin? No     Discharge Plan A Home with family     Discharge Plan B Home with family     DME Needed Upon Discharge  none     Discharge Plan discussed with: Patient     Discharge Barriers Identified None

## 2022-12-14 NOTE — PLAN OF CARE
12/14/22 1208   Final Note   Assessment Type Final Discharge Note   Anticipated Discharge Disposition Home   What phone number can be called within the next 1-3 days to see how you are doing after discharge? [phone]   Post-Acute Status   Coverage BCBS   Discharge Delays None known at this time       Discharge orders and chart reviewed with no further post-acute discharge needs identified at this time.  At this time, patient is cleared for discharge from Case Management standpoint.

## 2022-12-14 NOTE — PLAN OF CARE
Problem: Adult Inpatient Plan of Care  Goal: Plan of Care Review  Outcome: Ongoing, Progressing  Goal: Optimal Comfort and Wellbeing  Outcome: Ongoing, Progressing    POC reviewed with pt.  Pt denies needs at this time.  Call light within reach.

## 2022-12-21 ENCOUNTER — CLINICAL SUPPORT (OUTPATIENT)
Dept: CARDIOLOGY | Facility: CLINIC | Age: 62
End: 2022-12-21
Payer: COMMERCIAL

## 2022-12-21 PROCEDURE — 99999 PR PBB SHADOW E&M-EST. PATIENT-LVL I: ICD-10-PCS | Mod: PBBFAC,,,

## 2022-12-21 PROCEDURE — 99999 PR PBB SHADOW E&M-EST. PATIENT-LVL I: CPT | Mod: PBBFAC,,,

## 2023-03-22 ENCOUNTER — OFFICE VISIT (OUTPATIENT)
Dept: CARDIOLOGY | Facility: CLINIC | Age: 63
End: 2023-03-22
Payer: COMMERCIAL

## 2023-03-22 VITALS
HEIGHT: 72 IN | HEART RATE: 80 BPM | SYSTOLIC BLOOD PRESSURE: 126 MMHG | OXYGEN SATURATION: 97 % | DIASTOLIC BLOOD PRESSURE: 84 MMHG | RESPIRATION RATE: 16 BRPM | BODY MASS INDEX: 34.04 KG/M2 | WEIGHT: 251.31 LBS

## 2023-03-22 DIAGNOSIS — I50.20 SYSTOLIC CONGESTIVE HEART FAILURE, UNSPECIFIED HF CHRONICITY: ICD-10-CM

## 2023-03-22 DIAGNOSIS — I48.0 PAROXYSMAL ATRIAL FIBRILLATION: ICD-10-CM

## 2023-03-22 DIAGNOSIS — I10 ESSENTIAL HYPERTENSION: ICD-10-CM

## 2023-03-22 DIAGNOSIS — I51.9 LV DYSFUNCTION: ICD-10-CM

## 2023-03-22 PROCEDURE — 1160F PR REVIEW ALL MEDS BY PRESCRIBER/CLIN PHARMACIST DOCUMENTED: ICD-10-PCS | Mod: CPTII,S$GLB,, | Performed by: INTERNAL MEDICINE

## 2023-03-22 PROCEDURE — 4010F ACE/ARB THERAPY RXD/TAKEN: CPT | Mod: CPTII,S$GLB,, | Performed by: INTERNAL MEDICINE

## 2023-03-22 PROCEDURE — 3008F BODY MASS INDEX DOCD: CPT | Mod: CPTII,S$GLB,, | Performed by: INTERNAL MEDICINE

## 2023-03-22 PROCEDURE — 3079F DIAST BP 80-89 MM HG: CPT | Mod: CPTII,S$GLB,, | Performed by: INTERNAL MEDICINE

## 2023-03-22 PROCEDURE — 99214 OFFICE O/P EST MOD 30 MIN: CPT | Mod: S$GLB,,, | Performed by: INTERNAL MEDICINE

## 2023-03-22 PROCEDURE — 1160F RVW MEDS BY RX/DR IN RCRD: CPT | Mod: CPTII,S$GLB,, | Performed by: INTERNAL MEDICINE

## 2023-03-22 PROCEDURE — 1159F PR MEDICATION LIST DOCUMENTED IN MEDICAL RECORD: ICD-10-PCS | Mod: CPTII,S$GLB,, | Performed by: INTERNAL MEDICINE

## 2023-03-22 PROCEDURE — 4010F PR ACE/ARB THEARPY RXD/TAKEN: ICD-10-PCS | Mod: CPTII,S$GLB,, | Performed by: INTERNAL MEDICINE

## 2023-03-22 PROCEDURE — 3074F PR MOST RECENT SYSTOLIC BLOOD PRESSURE < 130 MM HG: ICD-10-PCS | Mod: CPTII,S$GLB,, | Performed by: INTERNAL MEDICINE

## 2023-03-22 PROCEDURE — 1159F MED LIST DOCD IN RCRD: CPT | Mod: CPTII,S$GLB,, | Performed by: INTERNAL MEDICINE

## 2023-03-22 PROCEDURE — 3008F PR BODY MASS INDEX (BMI) DOCUMENTED: ICD-10-PCS | Mod: CPTII,S$GLB,, | Performed by: INTERNAL MEDICINE

## 2023-03-22 PROCEDURE — 99214 PR OFFICE/OUTPT VISIT, EST, LEVL IV, 30-39 MIN: ICD-10-PCS | Mod: S$GLB,,, | Performed by: INTERNAL MEDICINE

## 2023-03-22 PROCEDURE — 3074F SYST BP LT 130 MM HG: CPT | Mod: CPTII,S$GLB,, | Performed by: INTERNAL MEDICINE

## 2023-03-22 PROCEDURE — 3079F PR MOST RECENT DIASTOLIC BLOOD PRESSURE 80-89 MM HG: ICD-10-PCS | Mod: CPTII,S$GLB,, | Performed by: INTERNAL MEDICINE

## 2023-03-22 RX ORDER — LANOLIN ALCOHOL/MO/W.PET/CERES
400 CREAM (GRAM) TOPICAL DAILY
Qty: 90 TABLET | Refills: 3 | Status: SHIPPED | OUTPATIENT
Start: 2023-03-22 | End: 2024-03-21

## 2023-03-22 RX ORDER — SACUBITRIL AND VALSARTAN 24; 26 MG/1; MG/1
1 TABLET, FILM COATED ORAL 2 TIMES DAILY
Qty: 180 TABLET | Refills: 3 | Status: SHIPPED | OUTPATIENT
Start: 2023-03-22 | End: 2024-02-29 | Stop reason: SDUPTHER

## 2023-03-22 RX ORDER — CARVEDILOL 6.25 MG/1
6.25 TABLET ORAL 2 TIMES DAILY
Qty: 180 TABLET | Refills: 3 | Status: SHIPPED | OUTPATIENT
Start: 2023-03-22 | End: 2024-02-29 | Stop reason: SDUPTHER

## 2023-03-22 RX ORDER — FUROSEMIDE 20 MG/1
20 TABLET ORAL EVERY OTHER DAY
Qty: 45 TABLET | Refills: 3 | Status: SHIPPED | OUTPATIENT
Start: 2023-03-22 | End: 2023-04-20 | Stop reason: SDUPTHER

## 2023-03-22 RX ORDER — SPIRONOLACTONE 25 MG/1
25 TABLET ORAL DAILY
Qty: 90 TABLET | Refills: 3 | Status: SHIPPED | OUTPATIENT
Start: 2023-03-22 | End: 2024-02-29 | Stop reason: SDUPTHER

## 2023-03-22 NOTE — PROGRESS NOTES
Subjective:    Patient ID:  Riana Beltran is a 62 y.o. male patient here for evaluation Follow-up (Doing well, he has stopped smoking)      History of Present Illness:   Here for follow-up evaluation had successful device implantation on 12/13/2022 and he is healed well.  He has no recurrence of atrial fibrillation doing good he is quit tobacco usage since then he is gained about 30 lb.    No occurrence of any angina shortness of breath has been controlled no swelling in the lower extremities no palpitations no discharges from the ICD.          Review of patient's allergies indicates:  No Known Allergies    Past Medical History:   Diagnosis Date    A-fib     Back pain 2020    Bilateral radial fractures 2020    Broken ribs 02/29/2020    HF (heart failure)     HTN (hypertension)     Neck pain 2020     Past Surgical History:   Procedure Laterality Date    ANKLE SURGERY Right     APPENDECTOMY      INSERTION, ICD, DUAL CHAMBER Left 12/13/2022    Procedure: Insertion, ICD, Dual Chamber;  Surgeon: Juan Dejesus MD;  Location: [facility] CATH/EP LAB;  Service: Cardiology;  Laterality: Left;    KNEE SURGERY Left     patella realignment    LATERAL EPICONDYLE RELEASE Right 3/11/2020    Procedure: RELEASE, ELBOW, LATERAL EPICONDYLE;  Surgeon: Saul Wynn MD;  Location: [facility] OR;  Service: Orthopedics;  Laterality: Right;    LEFT HEART CATHETERIZATION Left 9/14/2022    Procedure: Left heart cath;  Surgeon: Jamel Dejesus MD;  Location: [facility] CATH/EP LAB;  Service: Cardiology;  Laterality: Left;    NECK SURGERY      laminectomy    OPEN REDUCTION AND INTERNAL FIXATION (ORIF) OF FRACTURE OF RADIUS Right 3/11/2020    Procedure: ORIF, FRACTURE, RADIUS;  Surgeon: Saul Wynn MD;  Location: [facility] OR;  Service: Orthopedics;  Laterality: Right;  Synthes, needs compression screws BENJAMIN SANTIAGO NOTIFIED    OSTEOTOMY OF ZYGOMATIC BONE AND ORBIT      TONSILLECTOMY      VENOGRAM, CATH LAB  12/13/2022    Procedure: Venogram, Cath Lab;   Surgeon: Juan Dejesus MD;  Location: [facility] CATH/EP LAB;  Service: Cardiology;;     Social History     Tobacco Use    Smoking status: Former     Packs/day: 1.00     Years: 10.00     Pack years: 10.00     Types: Cigarettes     Quit date: 2022     Years since quittin.5    Smokeless tobacco: Never   Substance Use Topics    Alcohol use: Not Currently    Drug use: Never        Review of Systems:    As noted in HPI in addition      REVIEW OF SYSTEMS  CARDIOVASCULAR: No recent chest pain, palpitations, arm, neck, or jaw pain  RESPIRATORY: No recent fever, cough chills, SOB or congestion  : No blood in the urine  GI: No Nausea, vomiting, constipation, diarrhea, blood, or reflux.  MUSCULOSKELETAL: No myalgias  NEURO: No lightheadedness or dizziness  EYES: No Double vision, blurry, vision or headache              Objective        Vitals:    23 1522   BP: 126/84   Pulse: 80   Resp: 16       LIPIDS - LAST 2   No results found for: CHOL, HDL, LDLCALC, TRIG, CHOLHDL    CBC - LAST 2  Lab Results   Component Value Date    WBC 8.11 2022    WBC 7.35 2022    RBC 4.57 (L) 2022    RBC 4.21 (L) 2022    HGB 14.4 2022    HGB 13.2 (L) 2022    HCT 41.3 2022    HCT 39.0 (L) 2022    MCV 90 2022    MCV 93 2022    MCH 31.5 (H) 2022    MCH 31.4 (H) 2022    MCHC 34.9 2022    MCHC 33.8 2022    RDW 12.3 2022    RDW 12.3 2022     2022     2022    MPV 10.5 2022    MPV 9.9 2022    GRAN 4.2 2022    GRAN 56.9 2022    LYMPH 2.0 2022    LYMPH 27.5 2022    MONO 0.7 2022    MONO 9.0 2022    BASO 0.03 2022    BASO 0.05 2022    NRBC 0 2022    NRBC 0 2022       CHEMISTRY & LIVER FUNCTION - LAST 2  Lab Results   Component Value Date     (L) 2022     2022    K 4.2 2022    K 4.1 2022    CL 98 2022      12/02/2022    CO2 24 12/08/2022    CO2 24 12/02/2022    ANIONGAP 12 12/08/2022    ANIONGAP 8 12/02/2022    BUN 13 12/08/2022    BUN 14 12/02/2022    CREATININE 0.6 12/08/2022    CREATININE 0.6 12/02/2022     12/08/2022     (H) 12/02/2022    CALCIUM 9.5 12/08/2022    CALCIUM 9.0 12/02/2022    MG 2.2 12/08/2022    ALBUMIN 4.7 12/08/2022    ALBUMIN 4.3 12/02/2022    PROT 7.9 12/08/2022    PROT 7.0 12/02/2022    ALKPHOS 85 12/08/2022    ALKPHOS 91 12/02/2022    ALT 32 12/08/2022    ALT 40 12/02/2022    AST 23 12/08/2022    AST 26 12/02/2022    BILITOT 1.1 (H) 12/08/2022    BILITOT 0.7 12/02/2022        CARDIAC PROFILE - LAST 2  No results found for: BNP, CPK, CPKMB, LDH, TROPONINI, TROPONINIHS     COAGULATION - LAST 2  Lab Results   Component Value Date    LABPT 13.5 12/08/2022    INR 1.1 12/08/2022    APTT 34.9 12/08/2022    APTT 34.8 09/12/2022       ENDOCRINE & PSA - LAST 2  No results found for: HGBA1C, MICROALBUR, TSH, PROCAL, PSA     ECHOCARDIOGRAM RESULTS  Results for orders placed during the hospital encounter of 11/10/22    Echo    Interpretation Summary  · The left ventricle is mildly enlarged with concentric remodeling and moderately decreased systolic function.  · The estimated ejection fraction is 30%.  · Grade I left ventricular diastolic dysfunction.  · There is left ventricular global hypokinesis.  · Normal right ventricular size with normal right ventricular systolic function.  · Mild left atrial enlargement.  · Mild mitral regurgitation.  · Mild tricuspid regurgitation.  · Normal central venous pressure (3 mmHg).  · The estimated PA systolic pressure is 24 mmHg.      CURRENT/PREVIOUS VISIT EKG  Results for orders placed or performed during the hospital encounter of 12/13/22   EKG 12-lead    Collection Time: 12/13/22 12:11 PM    Narrative    Test Reason : Z95.810,    Vent. Rate : 061 BPM     Atrial Rate : 061 BPM     P-R Int : 080 ms          QRS Dur : 106 ms      QT Int : 416 ms        P-R-T Axes : 000 -09 041 degrees     QTc Int : 418 ms    Program found technically poor ECG  Sinus rhythm with short OR  Minimal voltage criteria for LVH, may be normal variant ( R in aVL )  Cannot rule out Anterior infarct ,age undetermined  Abnormal ECG  When compared with ECG of 08-DEC-2022 11:14,  Aberrant conduction is no longer Present  OR interval has decreased  Incomplete right bundle branch block is no longer Present  Minimal criteria for Anterior infarct are now Present  Confirmed by Juan Dejesus MD ([id number]) on 12/15/2022 2:50:33 PM    Referred By:             Confirmed By:Juan Dejesus MD     No valid procedures specified.   Results for orders placed during the hospital encounter of 08/29/22    Nuclear Stress - Cardiology Interpreted    Interpretation Summary    Abnormal myocardial perfusion scan.    There is a moderate intensity, small to moderate sized, reversible perfusion abnormality that is consistent with ischemia in the inferolateral wall(s).    There are no other significant perfusion abnormalities.    The gated perfusion images showed an ejection fraction of 25% post stress. Normal ejection fraction is greater than 47%.    There is  and severe global hypokinesis at stress .    LV cavity size is  and moderately enlarged at stress.    The EKG portion of this study is negative for ischemia.    The patient reported no chest pain during the stress test.    During stress, occasional PVCs are noted.    No valid procedures specified.    PHYSICAL EXAM  CONSTITUTIONAL: Well built, well nourished in no apparent distress  NECK: no carotid bruit, no JVD  LUNGS: CTA  CHEST WALL: no tenderness  Are ICD site is healed well.  Reviewed the chest x-ray as well.  HEART: regular rate and rhythm, S1, S2 normal, no murmur, click, rub or gallop   ABDOMEN: soft, non-tender; bowel sounds normal; no masses,  no organomegaly  EXTREMITIES: Extremities normal, no edema, no calf tenderness noted  NEURO: AAO X 3    I HAVE  REVIEWED :    The vital signs, nurses notes, and all the pertinent radiology and labs.        Current Outpatient Medications   Medication Instructions    apixaban (ELIQUIS) 5 mg, Oral, 2 times daily    carvediloL (COREG) 6.25 mg, Oral, 2 times daily    cephALEXin (KEFLEX) 500 mg, Oral, Every 8 hours    furosemide (LASIX) 20 mg, Oral, Every other day    ibuprofen (ADVIL,MOTRIN) 200 mg, Oral, Every 6 hours PRN    magnesium oxide (MAG-OX) 400 mg, Oral, Daily    sacubitriL-valsartan (ENTRESTO) 24-26 mg per tablet 1 tablet, Oral, 2 times daily    spironolactone (ALDACTONE) 25 mg, Oral, Daily          Assessment & Plan     Paroxysmal atrial fibrillation  Continue on Eliquis 5 mg p.o. b.i.d.   Continue on Coreg 6.25 mg p.o. b.i.d. along with magnesium supplements  Stable at this time    Systolic congestive heart failure  Patient is identified as having Systolic (HFrEF) heart failure that is Chronic. CHF is currently controlled. Latest ECHO performed and demonstrates- Results for orders placed during the hospital encounter of 11/10/22    Echo    Interpretation Summary  · The left ventricle is mildly enlarged with concentric remodeling and moderately decreased systolic function.  · The estimated ejection fraction is 30%.  · Grade I left ventricular diastolic dysfunction.  · There is left ventricular global hypokinesis.  · Normal right ventricular size with normal right ventricular systolic function.  · Mild left atrial enlargement.  · Mild mitral regurgitation.  · Mild tricuspid regurgitation.  · Normal central venous pressure (3 mmHg).  · The estimated PA systolic pressure is 24 mmHg.  . Continue Beta Blocker, ACE/ARB, Furosemide, Aldactone and ARNI and monitor clinical status closely. Monitor on telemetry. Patient is on CHF pathway.  Monitor strict Is&Os and daily weights.  Place on fluid restriction of 1.5 L. Continue to stress to patient importance of self efficacy and  on diet for CHF.     LV  dysfunction  Continue on present regimen he is very well compensated clinically.  He is going to start on an exercise program for cardiovascular conditioning and aggressive weight management    Essential hypertension  Blood pressure has been stable at 120 6/84 mm Hg continue Entresto 24/26, Aldactone 25 mg daily, Lasix 20 mg every other day, Coreg 6.25 mg p.o. b.i.d. maintain a salt restricted diet and magnesium supplements.          No follow-ups on file.

## 2023-03-22 NOTE — ASSESSMENT & PLAN NOTE
Continue on present regimen he is very well compensated clinically.  He is going to start on an exercise program for cardiovascular conditioning and aggressive weight management

## 2023-03-22 NOTE — ASSESSMENT & PLAN NOTE
Blood pressure has been stable at 120 6/84 mm Hg continue Entresto 24/26, Aldactone 25 mg daily, Lasix 20 mg every other day, Coreg 6.25 mg p.o. b.i.d. maintain a salt restricted diet and magnesium supplements.

## 2023-03-22 NOTE — ASSESSMENT & PLAN NOTE
Patient is identified as having Systolic (HFrEF) heart failure that is Chronic. CHF is currently controlled. Latest ECHO performed and demonstrates- Results for orders placed during the hospital encounter of 11/10/22    Echo    Interpretation Summary  · The left ventricle is mildly enlarged with concentric remodeling and moderately decreased systolic function.  · The estimated ejection fraction is 30%.  · Grade I left ventricular diastolic dysfunction.  · There is left ventricular global hypokinesis.  · Normal right ventricular size with normal right ventricular systolic function.  · Mild left atrial enlargement.  · Mild mitral regurgitation.  · Mild tricuspid regurgitation.  · Normal central venous pressure (3 mmHg).  · The estimated PA systolic pressure is 24 mmHg.  . Continue Beta Blocker, ACE/ARB, Furosemide, Aldactone and ARNI and monitor clinical status closely. Monitor on telemetry. Patient is on CHF pathway.  Monitor strict Is&Os and daily weights.  Place on fluid restriction of 1.5 L. Continue to stress to patient importance of self efficacy and  on diet for CHF.

## 2023-03-22 NOTE — ASSESSMENT & PLAN NOTE
Continue on Eliquis 5 mg p.o. b.i.d.   Continue on Coreg 6.25 mg p.o. b.i.d. along with magnesium supplements  Stable at this time

## 2023-05-11 DIAGNOSIS — I42.8 NONISCHEMIC CARDIOMYOPATHY: Primary | ICD-10-CM

## 2023-06-20 ENCOUNTER — TELEPHONE (OUTPATIENT)
Dept: CARDIOLOGY | Facility: CLINIC | Age: 63
End: 2023-06-20
Payer: COMMERCIAL

## 2023-06-20 DIAGNOSIS — I48.0 PAROXYSMAL ATRIAL FIBRILLATION: Primary | ICD-10-CM

## 2023-07-28 DIAGNOSIS — I48.0 PAROXYSMAL ATRIAL FIBRILLATION: Primary | ICD-10-CM

## 2023-07-28 DIAGNOSIS — I50.20 SYSTOLIC CONGESTIVE HEART FAILURE, UNSPECIFIED HF CHRONICITY: ICD-10-CM

## 2023-08-31 ENCOUNTER — OFFICE VISIT (OUTPATIENT)
Dept: URGENT CARE | Facility: CLINIC | Age: 63
End: 2023-08-31
Payer: COMMERCIAL

## 2023-08-31 VITALS
HEART RATE: 68 BPM | RESPIRATION RATE: 12 BRPM | OXYGEN SATURATION: 98 % | TEMPERATURE: 98 F | SYSTOLIC BLOOD PRESSURE: 142 MMHG | DIASTOLIC BLOOD PRESSURE: 96 MMHG

## 2023-08-31 DIAGNOSIS — M10.9 ACUTE GOUT OF LEFT FOOT, UNSPECIFIED CAUSE: Primary | ICD-10-CM

## 2023-08-31 PROCEDURE — 99203 PR OFFICE/OUTPT VISIT, NEW, LEVL III, 30-44 MIN: ICD-10-PCS | Mod: S$GLB,,, | Performed by: PHYSICIAN ASSISTANT

## 2023-08-31 PROCEDURE — 99203 OFFICE O/P NEW LOW 30 MIN: CPT | Mod: S$GLB,,, | Performed by: PHYSICIAN ASSISTANT

## 2023-08-31 RX ORDER — PREDNISONE 10 MG/1
TABLET ORAL
Qty: 11 TABLET | Refills: 0 | Status: SHIPPED | OUTPATIENT
Start: 2023-08-31

## 2023-08-31 RX ORDER — COLCHICINE 0.6 MG/1
TABLET ORAL
Qty: 30 TABLET | Refills: 0 | Status: SHIPPED | OUTPATIENT
Start: 2023-08-31

## 2023-08-31 NOTE — LETTER
August 31, 2023      [facility] [place] [address], [address]  [facility] [address]  Phone: [phone]  Fax: [phone]       Patient: Riana Beltran   YOB: 1960  Date of Visit: 08/31/2023    To Whom It May Concern:    Bulmaro Beltran  was at [facility] on 08/31/2023. The patient may return to work/school on 9/4/2023 with no restrictions. If you have any questions or concerns, or if I can be of further assistance, please do not hesitate to contact me.    Sincerely,    SORIN Mauricio

## 2023-08-31 NOTE — PROGRESS NOTES
Subjective:      Patient ID: Riana Beltran is a 62 y.o. male.    Vitals:  temporal temperature is 98 °F (36.7 °C). His blood pressure is 142/96 (abnormal) and his pulse is 68. His respiration is 12 and oxygen saturation is 98%.     Chief Complaint: Gout    Pt states left foot on the side gout attack, he had been icing the foot and rest  He did speak to a pharmacist and was told there is a medication he should not take ibuprofen       Other  This is a new problem. The current episode started in the past 7 days. The problem has been unchanged. Associated symptoms include arthralgias and joint swelling. Nothing aggravates the symptoms. He has tried ice and rest for the symptoms. The treatment provided no relief.       Musculoskeletal:  Positive for joint pain and joint swelling. Negative for trauma.   Skin:  Positive for color change and erythema.      Objective:     Physical Exam   Constitutional: He does not appear ill. No distress.   HENT:   Head: Normocephalic and atraumatic.   Ears:   Right Ear: External ear normal.   Left Ear: External ear normal.   Eyes: Conjunctivae are normal. Right eye exhibits no discharge. Left eye exhibits no discharge. Extraocular movement intact   Cardiovascular: Normal rate, regular rhythm and normal heart sounds.   No murmur heard.  Pulmonary/Chest: Effort normal. No respiratory distress.   Abdominal: Normal appearance.   Musculoskeletal: Normal range of motion.         General: Normal range of motion.      Comments: Erythema, edema and tenderness at 5th left proximal metatarsal head   Neurological: He is alert.   Skin: Skin is warm, dry and not pale. erythema jaundice  Psychiatric: His behavior is normal. Mood, judgment and thought content normal.   Nursing note and vitals reviewed.      Assessment:     1. Acute gout of left foot, unspecified cause        Plan:       Acute gout of left foot, unspecified cause    Other orders  -     colchicine, gout, (COLCRYS) 0.6 mg tablet; Day  1: Oral: 1.2 mg at once, followed by 0.6 mg after 1 hour. Day 2 and thereafter: Oral: 0.6 mg once or twice daily until flare resolves  Dispense: 30 tablet; Refill: 0  -     predniSONE (DELTASONE) 10 MG tablet; Take 40mg for 1 days, take 30mg for 1 days, take 20mg for 1 days, take 10mg for 2 days  Dispense: 11 tablet; Refill: 0      Gout Discharge Instructions   About this topic   Gout causes pain and swelling in a joint, which is a form of arthritis. Gout can happen in some people who have too much uric acid in their blood. Everyone has some uric acid in their blood. It is produced when the body breaks down certain foods. In some cases, too much uric acid builds up.  Uric acid can form sharp crystals that can sometimes build up in different parts of your body, like your joints. Then you may have pain and swelling. This is called a gout flare or attack. Most of the time a painful joint from gout will get better, especially with treatment, within a few days to a few weeks.     Proper treatment may improve signs and help prevent more gout attacks. Treatments needed are drugs, diet, and lifestyle changes. Surgery may be needed if the gouty part (tophi) is affecting your movement.  What care is needed at home?   Ask your doctor what you need to do when you go home. Make sure you ask questions if you do not understand what the doctor says.  Take all your medicines as ordered for treating your gout flare. This may include medicines ordered by your doctor or medicines like ibuprofen or naproxen for swelling and pain. These are nonsteroidal anti-inflammatory drugs (NSAIDS). Some over-the-counter medicines and prescription medicines contain the same drug. Do not take multiple medicines without talking to your doctor first.  Do not take aspirin to treat your gout flare.  Rest your joint. Prop it on pillows, keeping it above the level of your heart. This may help lessen pain and swelling.  Ice may help with your pain. Place an  ice pack or a bag of frozen vegetables wrapped in a towel over the painful part. Never put ice right on the skin. Do not leave the ice on more than 10 to 15 minutes at a time.  Eat a healthy diet that includes plenty of fruits, vegetables, whole grain, and low-fat dairy products.  Drink plenty of water. Limit sugary drinks and alcohol as they can make your gout flare worse.  Talk with your regular doctor about other medicines or lifestyle changes that can help prevent gout flares. Keeping a healthy weight or losing weight in a healthy way may help.  What follow-up care is needed?   Your doctor may ask you to make visits to the office to check on your progress. Be sure to keep these visits.  You may need to have blood tests to measure your uric acid levels. The doctor may also order tests to check how well your kidneys are working.  What drugs may be needed?   The doctor may order drugs to:  Help with pain and swelling  Lower uric acid levels  Prevent gout attacks  Your doctor may also change some of the drugs you are taking. Certain drugs may increase your uric acid levels and this makes gout worse.  Will physical activity be limited?   Exercise regularly. This can help reduce pain and improve your body function. Your doctor can help you figure out the best exercise for you.  What changes to diet are needed?   Ask for a dietitian to review your food choices.  Avoid high-purine foods such as:  Red meats like steak and hamburgers  Organ meat like kidney, liver, or brains  Wild game like rabbit, venison, quail, pheasant, and goose  [place], anchovies, sardines, or caviar  Seafood and shellfish like shrimp, lobsters, mackerel, sardines, mussels, tuna, trout, codfish, esdras, herring, or scallops  Beer, wine, and mixed drinks (alcohol)  Dried beans and peas  Some vegetables, such as asparagus, mushrooms, spinach, and cauliflower  Gravy  Avoid high-fructose foods such as:  Sweetened drinks and juices  Foods with added  fructose corn syrup like sodas, enriched fruit drinks, cereals, ice creams, and candy  What problems could happen?   Kidney stones and other kidney problems  Long-lasting joint problem  Ongoing very bad pain  What can be done to prevent this health problem?   Tell your doctor if you are taking drugs for heart and kidney problems. Tell your doctor about all the drugs, vitamins, herbs, supplements, and any over-the-counter drugs you are taking. If you need to stop taking these drugs, ask your doctor first.  When do I need to call the doctor?   You have a fever of 102.2°F (39°C) or higher, or chills with severe joint pain that does not get better with treatment.  You have a fever of 100.4°F (38°C) or higher or chills.  You have pain with passing urine.  Your symptoms are not improving within 2 to 3 days or your pain does not go away completely after a few weeks.  Teach Back: Helping You Understand   The Teach Back Method helps you understand the information we are giving you. After you talk with the staff, tell them in your own words what you learned. This helps to make sure the staff has described each thing clearly. It also helps to explain things that may have been confusing. Before going home, make sure you can do these:  I can tell you about my condition.  I can tell you what may help ease my pain.  I can tell you what changes I need to make with my diet or drugs.  I can tell you what I will do if I have warm, red, swollen joints or very bad pain.  Where can I learn more?   American Academy of Family Physicians  https://familydoctor.org/condition/gout/   National [employer] of Arthritis and Musculoskeletal and Skin Diseases  http://www.niams.nih.gov/Health_Info/Gout/default.asp   NHS  https://www.nhs.uk/conditions/gout/   Last Reviewed Date   2021-06-16  Consumer Information Use and Disclaimer   This information is not specific medical advice and does not replace information you receive from your health care  provider. This is only a brief summary of general information. It does NOT include all information about conditions, illnesses, injuries, tests, procedures, treatments, therapies, discharge instructions or life-style choices that may apply to you. You must talk with your health care provider for complete information about your health and treatment options. This information should not be used to decide whether or not to accept your health care providers advice, instructions or recommendations. Only your health care provider has the knowledge and training to provide advice that is right for you.  Copyright   Copyright © 2021 [employer], [employer]. and its affiliates and/or licensors. All rights reserved.

## 2023-09-05 DIAGNOSIS — I48.0 PAROXYSMAL ATRIAL FIBRILLATION: Primary | ICD-10-CM

## 2023-10-13 ENCOUNTER — OFFICE VISIT (OUTPATIENT)
Dept: GASTROENTEROLOGY | Facility: CLINIC | Age: 63
End: 2023-10-13
Payer: COMMERCIAL

## 2023-10-13 VITALS
WEIGHT: 242.5 LBS | DIASTOLIC BLOOD PRESSURE: 87 MMHG | BODY MASS INDEX: 32.85 KG/M2 | HEIGHT: 72 IN | SYSTOLIC BLOOD PRESSURE: 128 MMHG | HEART RATE: 68 BPM

## 2023-10-13 DIAGNOSIS — Z76.89 ENCOUNTER TO ESTABLISH CARE: ICD-10-CM

## 2023-10-13 DIAGNOSIS — Z87.898 HISTORY OF DIARRHEA: ICD-10-CM

## 2023-10-13 DIAGNOSIS — Z12.11 SCREENING FOR COLON CANCER: ICD-10-CM

## 2023-10-13 DIAGNOSIS — R10.11 RUQ PAIN: Primary | ICD-10-CM

## 2023-10-13 DIAGNOSIS — K80.20 GALLSTONES: ICD-10-CM

## 2023-10-13 DIAGNOSIS — Z79.01 CURRENT USE OF LONG TERM ANTICOAGULATION: ICD-10-CM

## 2023-10-13 DIAGNOSIS — K76.0 FATTY LIVER: ICD-10-CM

## 2023-10-13 PROCEDURE — 99999 PR PBB SHADOW E&M-EST. PATIENT-LVL V: CPT | Mod: PBBFAC,,,

## 2023-10-13 PROCEDURE — 3008F BODY MASS INDEX DOCD: CPT | Mod: CPTII,S$GLB,,

## 2023-10-13 PROCEDURE — 99203 OFFICE O/P NEW LOW 30 MIN: CPT | Mod: S$GLB,,,

## 2023-10-13 PROCEDURE — 3079F PR MOST RECENT DIASTOLIC BLOOD PRESSURE 80-89 MM HG: ICD-10-PCS | Mod: CPTII,S$GLB,,

## 2023-10-13 PROCEDURE — 3074F SYST BP LT 130 MM HG: CPT | Mod: CPTII,S$GLB,,

## 2023-10-13 PROCEDURE — 99999 PR PBB SHADOW E&M-EST. PATIENT-LVL V: ICD-10-PCS | Mod: PBBFAC,,,

## 2023-10-13 PROCEDURE — 4010F ACE/ARB THERAPY RXD/TAKEN: CPT | Mod: CPTII,S$GLB,,

## 2023-10-13 PROCEDURE — 3074F PR MOST RECENT SYSTOLIC BLOOD PRESSURE < 130 MM HG: ICD-10-PCS | Mod: CPTII,S$GLB,,

## 2023-10-13 PROCEDURE — 3008F PR BODY MASS INDEX (BMI) DOCUMENTED: ICD-10-PCS | Mod: CPTII,S$GLB,,

## 2023-10-13 PROCEDURE — 1160F PR REVIEW ALL MEDS BY PRESCRIBER/CLIN PHARMACIST DOCUMENTED: ICD-10-PCS | Mod: CPTII,S$GLB,,

## 2023-10-13 PROCEDURE — 4010F PR ACE/ARB THEARPY RXD/TAKEN: ICD-10-PCS | Mod: CPTII,S$GLB,,

## 2023-10-13 PROCEDURE — 1160F RVW MEDS BY RX/DR IN RCRD: CPT | Mod: CPTII,S$GLB,,

## 2023-10-13 PROCEDURE — 99203 PR OFFICE/OUTPT VISIT, NEW, LEVL III, 30-44 MIN: ICD-10-PCS | Mod: S$GLB,,,

## 2023-10-13 PROCEDURE — 3079F DIAST BP 80-89 MM HG: CPT | Mod: CPTII,S$GLB,,

## 2023-10-13 PROCEDURE — 1159F PR MEDICATION LIST DOCUMENTED IN MEDICAL RECORD: ICD-10-PCS | Mod: CPTII,S$GLB,,

## 2023-10-13 PROCEDURE — 1159F MED LIST DOCD IN RCRD: CPT | Mod: CPTII,S$GLB,,

## 2023-10-13 NOTE — PROGRESS NOTES
Subjective:       Patient ID: Riana Beltran is a 62 y.o. male Body mass index is 32.89 kg/m².    Chief Complaint: Abdominal Pain    This patient is new to me.     Abdominal Pain  This is a recurrent problem. The current episode started more than 1 month ago (has been recurrent 4-5 times since 12/2022, but most recent episode occurred last Sunday and Monday). The onset quality is gradual. The problem occurs intermittently. Duration: lasts about 3 hours. The problem has been waxing and waning. The pain is located in the RUQ. The pain is at a severity of 1/10. The pain is mild. Quality: Described as a tenderness. The abdominal pain radiates to the back (radiated during attack, but danielle currently). Associated symptoms include diarrhea (Diarrhea occurred a day after the attack; currently having 1-2 BMs daily rated stool 6 on [place] scale; he does report his stool color change to be gray after attack). Pertinent negatives include no anorexia, arthralgias, belching, constipation, dysuria (Denies dysuria, but does report a changing urine color after attack; he reports it was a dark orange, which has resolved), fever, flatus, frequency, headaches, hematochezia, hematuria, melena, myalgias, nausea, vomiting or weight loss. The pain is aggravated by eating and palpation (Attacks have occurred 1-2 hours after eating especially fried foods). The pain is relieved by Nothing. He has tried nothing for the symptoms. Prior diagnostic workup includes ultrasound and GI consult. His past medical history is significant for gallstones. There is no history of abdominal surgery, colon cancer, Crohn's disease, GERD, irritable bowel syndrome, pancreatitis, PUD or ulcerative colitis. Patient's medical history does not include kidney stones and UTI.     Review of Systems   Constitutional:  Negative for activity change, appetite change, chills, diaphoresis, fatigue, fever, unexpected weight change and weight loss.   HENT:   Negative for sore throat and trouble swallowing.    Respiratory:  Negative for cough, choking and shortness of breath.    Cardiovascular:  Negative for chest pain.   Gastrointestinal:  Positive for abdominal pain and diarrhea (Diarrhea occurred a day after the attack; currently having 1-2 BMs daily rated stool 6 on [place] scale; he does report his stool color change to be gray after attack). Negative for abdominal distention, anal bleeding, anorexia, blood in stool, constipation, flatus, hematochezia, melena, nausea, rectal pain and vomiting.   Genitourinary:  Negative for dysuria (Denies dysuria, but does report a changing urine color after attack; he reports it was a dark orange, which has resolved), frequency and hematuria.   Musculoskeletal:  Negative for arthralgias and myalgias.   Neurological:  Negative for headaches.       No LMP for male patient.  Past Medical History:   Diagnosis Date    A-fib     Back pain 2020    Bilateral radial fractures 2020    Broken ribs 02/29/2020    Celiac disease     HF (heart failure)     HTN (hypertension)     Neck pain 2020     Past Surgical History:   Procedure Laterality Date    ANKLE SURGERY Right     APPENDECTOMY      INSERTION, ICD, DUAL CHAMBER Left 12/13/2022    Procedure: Insertion, ICD, Dual Chamber;  Surgeon: Juan Dejesus MD;  Location: [facility] CATH/EP LAB;  Service: Cardiology;  Laterality: Left;    KNEE SURGERY Left     patella realignment    LATERAL EPICONDYLE RELEASE Right 03/11/2020    Procedure: RELEASE, ELBOW, LATERAL EPICONDYLE;  Surgeon: Saul Wynn MD;  Location: [facility] OR;  Service: Orthopedics;  Laterality: Right;    LEFT HEART CATHETERIZATION Left 09/14/2022    Procedure: Left heart cath;  Surgeon: Jamel Dejesus MD;  Location: [facility] CATH/EP LAB;  Service: Cardiology;  Laterality: Left;    NECK SURGERY      laminectomy    OPEN REDUCTION AND INTERNAL FIXATION (ORIF) OF FRACTURE OF RADIUS Right 03/11/2020    Procedure: ORIF, FRACTURE, RADIUS;   Surgeon: Saul Wynn MD;  Location: [facility] OR;  Service: Orthopedics;  Laterality: Right;  Synthes, needs compression screws BENJAMIN SANTIAGO NOTIFIED    OSTEOTOMY OF ZYGOMATIC BONE AND ORBIT      TONSILLECTOMY      VENOGRAM, CATH LAB  2022    Procedure: Venogram, Cath Lab;  Surgeon: Juan Dejesus MD;  Location: [facility] CATH/EP LAB;  Service: Cardiology;;     No family history on file.  Social History     Tobacco Use    Smoking status: Former     Current packs/day: 0.00     Average packs/day: 1 pack/day for 10.0 years (10.0 ttl pk-yrs)     Types: Cigarettes     Start date: 2012     Quit date: 2022     Years since quittin.1    Smokeless tobacco: Never   Substance Use Topics    Alcohol use: Not Currently    Drug use: Never     Wt Readings from Last 10 Encounters:   10/13/23 110 kg (242 lb 8.1 oz)   23 114 kg (251 lb 5.2 oz)   22 127.9 kg (281 lb 14.4 oz)   22 108.9 kg (240 lb)   22 112 kg (247 lb)   11/10/22 108.9 kg (240 lb)   10/05/22 108.9 kg (240 lb)   22 104.3 kg (230 lb)   22 104.3 kg (230 lb)   22 108.4 kg (239 lb)     Lab Results   Component Value Date    WBC 8.11 2022    HGB 14.4 2022    HCT 41.3 2022    MCV 90 2022     2022     CMP  Sodium   Date Value Ref Range Status   2022 134 (L) 136 - 145 mmol/L Final     Potassium   Date Value Ref Range Status   2022 4.2 3.5 - 5.1 mmol/L Final     Chloride   Date Value Ref Range Status   2022 98 95 - 110 mmol/L Final     CO2   Date Value Ref Range Status   2022 24 23 - 29 mmol/L Final     Glucose   Date Value Ref Range Status   2022 110 70 - 110 mg/dL Final     BUN   Date Value Ref Range Status   2022 13 6 - 20 mg/dL Final     Creatinine   Date Value Ref Range Status   2022 0.6 0.5 - 1.4 mg/dL Final     Calcium   Date Value Ref Range Status   2022 9.5 8.7 - 10.5 mg/dL Final     Total Protein   Date Value Ref Range Status    12/08/2022 7.9 6.0 - 8.4 g/dL Final     Albumin   Date Value Ref Range Status   12/08/2022 4.7 3.5 - 5.2 g/dL Final     Total Bilirubin   Date Value Ref Range Status   12/08/2022 1.1 (H) 0.1 - 1.0 mg/dL Final     Comment:     For infants and newborns, interpretation of results should be based  on gestational age, weight and in agreement with clinical  observations.    Premature Infant recommended reference ranges:  Up to 24 hours.............<8.0 mg/dL  Up to 48 hours............<12.0 mg/dL  3-5 days..................<15.0 mg/dL  6-29 days.................<15.0 mg/dL       Alkaline Phosphatase   Date Value Ref Range Status   12/08/2022 85 55 - 135 U/L Final     AST   Date Value Ref Range Status   12/08/2022 23 10 - 40 U/L Final     ALT   Date Value Ref Range Status   12/08/2022 32 10 - 44 U/L Final     Anion Gap   Date Value Ref Range Status   12/08/2022 12 8 - 16 mmol/L Final     eGFR if    Date Value Ref Range Status   03/04/2020 >60.0 >60 mL/min/1.73 m^2 Final     eGFR if non    Date Value Ref Range Status   03/04/2020 >60.0 >60 mL/min/1.73 m^2 Final     Comment:     Calculation used to obtain the estimated glomerular filtration  rate (eGFR) is the CKD-EPI equation.        Lab Results   Component Value Date    LIPASE 32 12/02/2022     Reviewed prior medical records including radiology report of abdominal ultrasound 12/02/2022 & endoscopy history (see surgical history).    Objective:      Physical Exam  Vitals and nursing note reviewed.   Constitutional:       General: He is not in acute distress.     Appearance: Normal appearance. He is not ill-appearing.   HENT:      Mouth/Throat:      Lips: Pink. No lesions.   Cardiovascular:      Rate and Rhythm: Normal rate and regular rhythm.      Heart sounds: Normal heart sounds.   Pulmonary:      Effort: Pulmonary effort is normal.      Breath sounds: Normal breath sounds.   Abdominal:      General: Abdomen is flat. Bowel sounds are  normal. There is no distension or abdominal bruit. There are no signs of injury.      Palpations: Abdomen is soft. There is no shifting dullness, fluid wave, hepatomegaly, splenomegaly or mass.      Tenderness: There is no abdominal tenderness. There is no guarding or rebound. Negative signs include Levine's sign, Rovsing's sign and McBurney's sign.   Skin:     General: Skin is warm and dry.      Coloration: Skin is not jaundiced or pale.   Neurological:      Mental Status: He is alert and oriented to person, place, and time.   Psychiatric:         Attention and Perception: Attention normal.         Mood and Affect: Mood normal.         Speech: Speech normal.         Behavior: Behavior normal.         Assessment:       1. RUQ pain    2. Gallstones    3. Fatty liver    4. History of diarrhea    5. Encounter to establish care    6. Current use of long term anticoagulation    7. Screening for colon cancer        Plan:       RUQ pain & Gallstones  - possible HIDA scan  - recommend low fat diet  -     Comprehensive Metabolic Panel; Future; Expected date: 10/13/2023  -     US Abdomen Limited (GALLBLADDER); Future; Expected date: 10/13/2023  -     Ambulatory referral/consult to General Surgery; Future; Expected date: 10/20/2023    Fatty liver  - For fatty liver recommend: low fat, low cholesterol diet, maintain good control of blood sugars and cholesterol levels, exercise, weight loss (if overweight), minimize/avoid alcohol and tylenol products, & follow-up with PCP for continued evaluation and management; if specialist is needed, recommend seeing hepatology.    History of diarrhea  -resolved  -if diarrhea returns consider stood studies    Encounter to establish care  -     Ambulatory referral/consult to Family Practice; Future; Expected date: 10/20/2023    Current use of long term anticoagulation    Screening for colon cancer  - discussed about Colonoscopy, including the risks and benefits of colonoscopy, patient  verbalized understanding but patient declined colonoscopy.    Follow up in about 4 weeks (around 11/10/2023), or if symptoms worsen or fail to improve.      If no improvement in symptoms or symptoms worsen, call/follow-up at clinic or go to ER.        Total time spent on the encounter includes face to face time and non-face to face time preparing to see the patient (eg, review of tests), Obtaining and/or reviewing separately obtained history, Documenting clinical information in the electronic or other health record, Independently interpreting results (not separately reported) and communicating results to the patient/family/caregiver, or Care coordination (not separately reported).     A dictation software program was used for this note. Please expect some simple typographical  errors in this note.

## 2023-10-16 ENCOUNTER — TELEPHONE (OUTPATIENT)
Dept: ADMINISTRATIVE | Facility: CLINIC | Age: 63
End: 2023-10-16
Payer: COMMERCIAL

## 2023-10-16 ENCOUNTER — TELEPHONE (OUTPATIENT)
Dept: FAMILY MEDICINE | Facility: CLINIC | Age: 63
End: 2023-10-16
Payer: COMMERCIAL

## 2023-10-17 ENCOUNTER — TELEPHONE (OUTPATIENT)
Dept: FAMILY MEDICINE | Facility: CLINIC | Age: 63
End: 2023-10-17
Payer: COMMERCIAL

## 2023-10-18 ENCOUNTER — TELEPHONE (OUTPATIENT)
Dept: FAMILY MEDICINE | Facility: CLINIC | Age: 63
End: 2023-10-18
Payer: COMMERCIAL

## 2023-10-24 ENCOUNTER — TELEPHONE (OUTPATIENT)
Dept: ADMINISTRATIVE | Facility: CLINIC | Age: 63
End: 2023-10-24
Payer: COMMERCIAL

## 2024-02-13 ENCOUNTER — PATIENT MESSAGE (OUTPATIENT)
Dept: CARDIOLOGY | Facility: CLINIC | Age: 64
End: 2024-02-13
Payer: COMMERCIAL

## 2024-02-14 ENCOUNTER — TELEPHONE (OUTPATIENT)
Dept: CARDIOLOGY | Facility: CLINIC | Age: 64
End: 2024-02-14
Payer: COMMERCIAL

## 2024-02-14 ENCOUNTER — PATIENT MESSAGE (OUTPATIENT)
Dept: CARDIOLOGY | Facility: CLINIC | Age: 64
End: 2024-02-14
Payer: COMMERCIAL

## 2024-02-14 NOTE — LETTER
February 15, 2024    Riana Beltran  [address] [place] [address]             [facility]  [address]  [id number]  [facility] [address]  Phone: [phone]  Fax: [phone] Patient: Riana Beltran  : 1960  Referring Doctor: [facility]  Proceudre: Dental Extractions    Current Outpatient Medications   Medication Sig    apixaban (ELIQUIS) 5 mg Tab Take 1 tablet (5 mg total) by mouth 2 (two) times daily.    carvediloL (COREG) 6.25 MG tablet Take 1 tablet (6.25 mg total) by mouth 2 (two) times daily.    cephALEXin (KEFLEX) 500 MG capsule Take 1 capsule (500 mg total) by mouth every 8 (eight) hours. (Patient not taking: Reported on 2023)    colchicine, gout, (COLCRYS) 0.6 mg tablet Day 1: Oral: 1.2 mg at once, followed by 0.6 mg after 1 hour. Day 2 and thereafter: Oral: 0.6 mg once or twice daily until flare resolves (Patient not taking: Reported on 10/13/2023)    furosemide (LASIX) 20 MG tablet Take 1 tablet (20 mg total) by mouth every other day.    ibuprofen (ADVIL,MOTRIN) 200 MG tablet Take 200 mg by mouth every 6 (six) hours as needed for Pain.    magnesium oxide (MAG-OX) 400 mg (241.3 mg magnesium) tablet Take 1 tablet (400 mg total) by mouth once daily.    predniSONE (DELTASONE) 10 MG tablet Take 40mg for 1 days, take 30mg for 1 days, take 20mg for 1 days, take 10mg for 2 days (Patient not taking: Reported on 10/13/2023)    sacubitriL-valsartan (ENTRESTO) 24-26 mg per tablet Take 1 tablet by mouth 2 (two) times daily.    spironolactone (ALDACTONE) 25 MG tablet Take 1 tablet (25 mg total) by mouth once daily.     No current facility-administered medications for this visit.       This patient has been assessed for risk factors for clearance of surgery with the following stipulations:    ___ No contraindications  ___ Recommendations for Eliquis, hold x __ days  ___ Low risk __ Moderate risk __ High  risk      If you have any questions regarding the above, please contact my office at [phone].    Sincerely,

## 2024-02-14 NOTE — TELEPHONE ENCOUNTER
----- Message from Martin Pickard sent at 2/14/2024 10:43 AM CST -----  Regarding: med question  Contact: [phone] / or can send portal msg  Type: Needs Medical Advice    Who Called:  Riana Pina Call Back Number: [phone] / or can send portal msg    Additional Information: pt needs to have tooth extracted. Dentist will not schedule until knows how long pt needs to be off blood thinners. Please call.

## 2024-02-29 RX ORDER — SPIRONOLACTONE 25 MG/1
25 TABLET ORAL DAILY
Qty: 90 TABLET | Refills: 3 | Status: SHIPPED | OUTPATIENT
Start: 2024-02-29 | End: 2025-02-28

## 2024-02-29 RX ORDER — CARVEDILOL 6.25 MG/1
6.25 TABLET ORAL 2 TIMES DAILY
Qty: 180 TABLET | Refills: 3 | Status: SHIPPED | OUTPATIENT
Start: 2024-02-29

## 2024-02-29 RX ORDER — SACUBITRIL AND VALSARTAN 24; 26 MG/1; MG/1
1 TABLET, FILM COATED ORAL 2 TIMES DAILY
Qty: 180 TABLET | Refills: 3 | Status: SHIPPED | OUTPATIENT
Start: 2024-02-29

## 2024-02-29 RX ORDER — FUROSEMIDE 20 MG/1
20 TABLET ORAL EVERY OTHER DAY
Qty: 45 TABLET | Refills: 3 | Status: SHIPPED | OUTPATIENT
Start: 2024-02-29 | End: 2025-02-28

## 2024-03-29 ENCOUNTER — PATIENT MESSAGE (OUTPATIENT)
Dept: CARDIOLOGY | Facility: CLINIC | Age: 64
End: 2024-03-29
Payer: COMMERCIAL

## 2024-08-29 DIAGNOSIS — Z95.810 AICD (AUTOMATIC CARDIOVERTER/DEFIBRILLATOR) PRESENT: Primary | ICD-10-CM

## 2024-09-04 ENCOUNTER — TELEPHONE (OUTPATIENT)
Dept: CARDIOLOGY | Facility: CLINIC | Age: 64
End: 2024-09-04
Payer: COMMERCIAL

## 2024-09-04 NOTE — TELEPHONE ENCOUNTER
----- Message from Martin Pickard sent at 9/4/2024 11:05 AM CDT -----  Regarding: ret call  Contact: nikunj at [phone]  Type:  Patient Returning Call    Who Called:  nikunj    Who Left Message for Patient:  office    Does the patient know what this is regarding?:  Reschedule: DEVICE CHECK - IN CLINIC [[id number]]    Best Call Back Number:  [phone]    Additional Information:

## 2024-10-27 ENCOUNTER — OFFICE VISIT (OUTPATIENT)
Dept: URGENT CARE | Facility: CLINIC | Age: 64
End: 2024-10-27
Payer: COMMERCIAL

## 2024-10-27 VITALS
TEMPERATURE: 98 F | WEIGHT: 242.5 LBS | SYSTOLIC BLOOD PRESSURE: 191 MMHG | HEIGHT: 72 IN | OXYGEN SATURATION: 96 % | DIASTOLIC BLOOD PRESSURE: 95 MMHG | RESPIRATION RATE: 17 BRPM | BODY MASS INDEX: 32.85 KG/M2 | HEART RATE: 79 BPM

## 2024-10-27 DIAGNOSIS — M10.9 ACUTE GOUT OF LEFT FOOT, UNSPECIFIED CAUSE: Primary | ICD-10-CM

## 2024-10-27 DIAGNOSIS — Z76.89 ENCOUNTER TO ESTABLISH CARE: ICD-10-CM

## 2024-10-27 PROCEDURE — 99203 OFFICE O/P NEW LOW 30 MIN: CPT | Mod: S$GLB,,, | Performed by: FAMILY MEDICINE

## 2024-10-27 RX ORDER — COLCHICINE 0.6 MG/1
TABLET ORAL
Qty: 7 TABLET | Refills: 0 | Status: SHIPPED | OUTPATIENT
Start: 2024-10-27

## 2024-10-27 RX ORDER — PREDNISONE 20 MG/1
TABLET ORAL
Qty: 6 TABLET | Refills: 0 | Status: SHIPPED | OUTPATIENT
Start: 2024-10-27 | End: 2024-11-01

## 2024-12-16 ENCOUNTER — HOSPITAL ENCOUNTER (INPATIENT)
Facility: HOSPITAL | Age: 64
LOS: 1 days | Discharge: HOME | DRG: 398 | End: 2024-12-17
Attending: EMERGENCY MEDICINE | Admitting: HOSPITALIST
Payer: COMMERCIAL

## 2024-12-16 DIAGNOSIS — K62.5 RECTAL BLEEDING: Primary | ICD-10-CM

## 2024-12-16 PROBLEM — K92.2 LOWER GI BLEED: Status: ACTIVE | Noted: 2024-12-16

## 2024-12-16 PROBLEM — I50.9 CHF (CONGESTIVE HEART FAILURE): Status: ACTIVE | Noted: 2024-12-16

## 2024-12-16 LAB
ABO + RH BLD: NORMAL
ALBUMIN SERPL BCP-MCNC: 4.3 G/DL (ref 3.5–5.2)
ALBUMIN SERPL BCP-MCNC: 4.6 G/DL (ref 3.5–5.2)
ALP SERPL-CCNC: 66 U/L (ref 55–135)
ALP SERPL-CCNC: 73 U/L (ref 55–135)
ALT SERPL W/O P-5'-P-CCNC: 16 U/L (ref 10–44)
ALT SERPL W/O P-5'-P-CCNC: 16 U/L (ref 10–44)
ANION GAP SERPL CALC-SCNC: 7 MMOL/L (ref 8–16)
ANION GAP SERPL CALC-SCNC: 8 MMOL/L (ref 8–16)
AST SERPL-CCNC: 15 U/L (ref 10–40)
AST SERPL-CCNC: 16 U/L (ref 10–40)
BASOPHILS # BLD AUTO: 0.04 K/UL (ref 0–0.2)
BASOPHILS # BLD AUTO: 0.06 K/UL (ref 0–0.2)
BASOPHILS NFR BLD: 0.5 % (ref 0–1.9)
BASOPHILS NFR BLD: 0.7 % (ref 0–1.9)
BILIRUB SERPL-MCNC: 0.7 MG/DL (ref 0.1–1)
BILIRUB SERPL-MCNC: 0.7 MG/DL (ref 0.1–1)
BLD GP AB SCN CELLS X3 SERPL QL: NORMAL
BUN SERPL-MCNC: 13 MG/DL (ref 8–23)
BUN SERPL-MCNC: 14 MG/DL (ref 8–23)
CALCIUM SERPL-MCNC: 8.9 MG/DL (ref 8.7–10.5)
CALCIUM SERPL-MCNC: 9.5 MG/DL (ref 8.7–10.5)
CHLORIDE SERPL-SCNC: 102 MMOL/L (ref 95–110)
CHLORIDE SERPL-SCNC: 102 MMOL/L (ref 95–110)
CO2 SERPL-SCNC: 26 MMOL/L (ref 23–29)
CO2 SERPL-SCNC: 26 MMOL/L (ref 23–29)
CREAT SERPL-MCNC: 0.7 MG/DL (ref 0.5–1.4)
CREAT SERPL-MCNC: 0.7 MG/DL (ref 0.5–1.4)
DIFFERENTIAL METHOD BLD: ABNORMAL
DIFFERENTIAL METHOD BLD: ABNORMAL
EOSINOPHIL # BLD AUTO: 0.3 K/UL (ref 0–0.5)
EOSINOPHIL # BLD AUTO: 0.3 K/UL (ref 0–0.5)
EOSINOPHIL NFR BLD: 3.3 % (ref 0–8)
EOSINOPHIL NFR BLD: 3.8 % (ref 0–8)
ERYTHROCYTE [DISTWIDTH] IN BLOOD BY AUTOMATED COUNT: 12.3 % (ref 11.5–14.5)
ERYTHROCYTE [DISTWIDTH] IN BLOOD BY AUTOMATED COUNT: 12.3 % (ref 11.5–14.5)
EST. GFR  (NO RACE VARIABLE): >60 ML/MIN/1.73 M^2
EST. GFR  (NO RACE VARIABLE): >60 ML/MIN/1.73 M^2
GLUCOSE SERPL-MCNC: 110 MG/DL (ref 70–110)
GLUCOSE SERPL-MCNC: 124 MG/DL (ref 70–110)
HCT VFR BLD AUTO: 34.7 % (ref 40–54)
HCT VFR BLD AUTO: 38.4 % (ref 40–54)
HGB BLD-MCNC: 11.8 G/DL (ref 14–18)
HGB BLD-MCNC: 12.9 G/DL (ref 14–18)
IMM GRANULOCYTES # BLD AUTO: 0.02 K/UL (ref 0–0.04)
IMM GRANULOCYTES # BLD AUTO: 0.03 K/UL (ref 0–0.04)
IMM GRANULOCYTES NFR BLD AUTO: 0.2 % (ref 0–0.5)
IMM GRANULOCYTES NFR BLD AUTO: 0.3 % (ref 0–0.5)
LYMPHOCYTES # BLD AUTO: 2.4 K/UL (ref 1–4.8)
LYMPHOCYTES # BLD AUTO: 2.6 K/UL (ref 1–4.8)
LYMPHOCYTES NFR BLD: 27.2 % (ref 18–48)
LYMPHOCYTES NFR BLD: 29.2 % (ref 18–48)
MCH RBC QN AUTO: 31.2 PG (ref 27–31)
MCH RBC QN AUTO: 31.4 PG (ref 27–31)
MCHC RBC AUTO-ENTMCNC: 33.6 G/DL (ref 32–36)
MCHC RBC AUTO-ENTMCNC: 34 G/DL (ref 32–36)
MCV RBC AUTO: 92 FL (ref 82–98)
MCV RBC AUTO: 93 FL (ref 82–98)
MONOCYTES # BLD AUTO: 0.8 K/UL (ref 0.3–1)
MONOCYTES # BLD AUTO: 0.9 K/UL (ref 0.3–1)
MONOCYTES NFR BLD: 10.3 % (ref 4–15)
MONOCYTES NFR BLD: 8.7 % (ref 4–15)
NEUTROPHILS # BLD AUTO: 4.9 K/UL (ref 1.8–7.7)
NEUTROPHILS # BLD AUTO: 5.3 K/UL (ref 1.8–7.7)
NEUTROPHILS NFR BLD: 56 % (ref 38–73)
NEUTROPHILS NFR BLD: 59.8 % (ref 38–73)
NRBC BLD-RTO: 0 /100 WBC
NRBC BLD-RTO: 0 /100 WBC
PLATELET # BLD AUTO: 308 K/UL (ref 150–450)
PLATELET # BLD AUTO: 328 K/UL (ref 150–450)
PMV BLD AUTO: 10 FL (ref 9.2–12.9)
PMV BLD AUTO: 9.9 FL (ref 9.2–12.9)
POTASSIUM SERPL-SCNC: 4 MMOL/L (ref 3.5–5.1)
POTASSIUM SERPL-SCNC: 4.1 MMOL/L (ref 3.5–5.1)
PROT SERPL-MCNC: 6.8 G/DL (ref 6–8.4)
PROT SERPL-MCNC: 7.2 G/DL (ref 6–8.4)
RBC # BLD AUTO: 3.76 M/UL (ref 4.6–6.2)
RBC # BLD AUTO: 4.13 M/UL (ref 4.6–6.2)
SODIUM SERPL-SCNC: 135 MMOL/L (ref 136–145)
SODIUM SERPL-SCNC: 136 MMOL/L (ref 136–145)
SPECIMEN OUTDATE: NORMAL
WBC # BLD AUTO: 8.72 K/UL (ref 3.9–12.7)
WBC # BLD AUTO: 8.9 K/UL (ref 3.9–12.7)

## 2024-12-16 PROCEDURE — G0378 HOSPITAL OBSERVATION PER HR: HCPCS

## 2024-12-16 PROCEDURE — 63600175 PHARM REV CODE 636 W HCPCS: Performed by: INTERNAL MEDICINE

## 2024-12-16 PROCEDURE — 85025 COMPLETE CBC W/AUTO DIFF WBC: CPT | Performed by: EMERGENCY MEDICINE

## 2024-12-16 PROCEDURE — 80053 COMPREHEN METABOLIC PANEL: CPT | Mod: 91 | Performed by: INTERNAL MEDICINE

## 2024-12-16 PROCEDURE — 85025 COMPLETE CBC W/AUTO DIFF WBC: CPT | Mod: 91 | Performed by: INTERNAL MEDICINE

## 2024-12-16 PROCEDURE — 25500020 PHARM REV CODE 255: Performed by: INTERNAL MEDICINE

## 2024-12-16 PROCEDURE — 36415 COLL VENOUS BLD VENIPUNCTURE: CPT | Performed by: INTERNAL MEDICINE

## 2024-12-16 PROCEDURE — 99285 EMERGENCY DEPT VISIT HI MDM: CPT | Mod: 25

## 2024-12-16 PROCEDURE — 96374 THER/PROPH/DIAG INJ IV PUSH: CPT

## 2024-12-16 PROCEDURE — 96375 TX/PRO/DX INJ NEW DRUG ADDON: CPT

## 2024-12-16 PROCEDURE — 86900 BLOOD TYPING SEROLOGIC ABO: CPT | Performed by: EMERGENCY MEDICINE

## 2024-12-16 PROCEDURE — 25000003 PHARM REV CODE 250: Performed by: INTERNAL MEDICINE

## 2024-12-16 PROCEDURE — 36415 COLL VENOUS BLD VENIPUNCTURE: CPT | Performed by: EMERGENCY MEDICINE

## 2024-12-16 PROCEDURE — 80053 COMPREHEN METABOLIC PANEL: CPT | Performed by: EMERGENCY MEDICINE

## 2024-12-16 RX ORDER — SODIUM,POTASSIUM PHOSPHATES 280-250MG
2 POWDER IN PACKET (EA) ORAL
Status: DISCONTINUED | OUTPATIENT
Start: 2024-12-16 | End: 2024-12-17 | Stop reason: HOSPADM

## 2024-12-16 RX ORDER — POLYETHYLENE GLYCOL 3350 17 G/17G
238 POWDER, FOR SOLUTION ORAL ONCE
Status: COMPLETED | OUTPATIENT
Start: 2024-12-16 | End: 2024-12-16

## 2024-12-16 RX ORDER — LANOLIN ALCOHOL/MO/W.PET/CERES
800 CREAM (GRAM) TOPICAL
Status: DISCONTINUED | OUTPATIENT
Start: 2024-12-16 | End: 2024-12-17 | Stop reason: HOSPADM

## 2024-12-16 RX ORDER — BISACODYL 5 MG
10 TABLET, DELAYED RELEASE (ENTERIC COATED) ORAL ONCE
Status: COMPLETED | OUTPATIENT
Start: 2024-12-16 | End: 2024-12-16

## 2024-12-16 RX ORDER — PANTOPRAZOLE SODIUM 40 MG/10ML
40 INJECTION, POWDER, LYOPHILIZED, FOR SOLUTION INTRAVENOUS 2 TIMES DAILY
Status: DISCONTINUED | OUTPATIENT
Start: 2024-12-16 | End: 2024-12-17 | Stop reason: HOSPADM

## 2024-12-16 RX ORDER — HYDRALAZINE HYDROCHLORIDE 20 MG/ML
10 INJECTION INTRAMUSCULAR; INTRAVENOUS EVERY 4 HOURS PRN
Status: DISCONTINUED | OUTPATIENT
Start: 2024-12-16 | End: 2024-12-17 | Stop reason: HOSPADM

## 2024-12-16 RX ADMIN — IOHEXOL 100 ML: 350 INJECTION, SOLUTION INTRAVENOUS at 06:12

## 2024-12-16 RX ADMIN — HYDRALAZINE HYDROCHLORIDE 10 MG: 20 INJECTION INTRAMUSCULAR; INTRAVENOUS at 08:12

## 2024-12-16 RX ADMIN — PANTOPRAZOLE SODIUM 40 MG: 40 INJECTION, POWDER, LYOPHILIZED, FOR SOLUTION INTRAVENOUS at 08:12

## 2024-12-16 RX ADMIN — POLYETHYLENE GLYCOL 3350 238 G: 17 POWDER, FOR SOLUTION ORAL at 10:12

## 2024-12-16 NOTE — ED PROVIDER NOTES
Chief complaint:  GI Bleeding (ONSET YESTERDAY AM. BLOODY DIARRHEA/)      HPI:  Riana Beltran is a 63 y.o. male with hx PAF on apixaban, AICD, CHF, NICMO (EF 30% 11/22) presenting with apparently one day of multiple episodes of loose stools with bright red blood.  This began yesterday.  He denies other symptoms.  Last dose of apixaban was yesterday morning.  He denies emesis.  No associated chest pain, shortness of breath, orthopnea, lower extremity edema.  No other bleeding or history of previous GI bleed.  He does have history of hemorrhoids with no current rectal pain.    ROS: As per HPI and below:  No rash oliguria, syncope or presyncope, headache, confusion.    Review of patient's allergies indicates:  No Known Allergies    Current Discharge Medication List        CONTINUE these medications which have NOT CHANGED    Details   apixaban (ELIQUIS) 5 mg Tab Take 1 tablet (5 mg total) by mouth 2 (two) times daily.  Qty: 180 tablet, Refills: 3      carvediloL (COREG) 6.25 MG tablet Take 1 tablet (6.25 mg total) by mouth 2 (two) times daily.  Qty: 180 tablet, Refills: 3    Comments: .      colchicine (COLCRYS) 0.6 mg tablet Day 1 take 1.2 mg (2 tabs) then take 0.6 mg ( 1 tab) one hour later. Day 2 through 5 take 0.6 mg (1 tab) daily until flare resolves.  Qty: 7 tablet, Refills: 0    Associated Diagnoses: Acute gout of left foot, unspecified cause      furosemide (LASIX) 20 MG tablet Take 1 tablet (20 mg total) by mouth every other day.  Qty: 45 tablet, Refills: 3      sacubitriL-valsartan (ENTRESTO) 24-26 mg per tablet Take 1 tablet by mouth 2 (two) times daily.  Qty: 180 tablet, Refills: 3      spironolactone (ALDACTONE) 25 MG tablet Take 1 tablet (25 mg total) by mouth once daily.  Qty: 90 tablet, Refills: 3    Comments: .             PMH:  As per HPI and below:  Past Medical History:   Diagnosis Date    A-fib     Back pain 2020    Bilateral radial fractures 2020    Broken ribs 02/29/2020    Celiac disease      HF (heart failure)     HTN (hypertension)     Neck pain      Past Surgical History:   Procedure Laterality Date    ANKLE SURGERY Right     APPENDECTOMY      INSERTION, ICD, DUAL CHAMBER Left 2022    Procedure: Insertion, ICD, Dual Chamber;  Surgeon: Juan Dejesus MD;  Location: [facility] CATH/EP LAB;  Service: Cardiology;  Laterality: Left;    KNEE SURGERY Left     patella realignment    LATERAL EPICONDYLE RELEASE Right 2020    Procedure: RELEASE, ELBOW, LATERAL EPICONDYLE;  Surgeon: Saul Wynn MD;  Location: [facility] OR;  Service: Orthopedics;  Laterality: Right;    LEFT HEART CATHETERIZATION Left 2022    Procedure: Left heart cath;  Surgeon: Jamel Dejesus MD;  Location: [facility] CATH/EP LAB;  Service: Cardiology;  Laterality: Left;    NECK SURGERY      laminectomy    OPEN REDUCTION AND INTERNAL FIXATION (ORIF) OF FRACTURE OF RADIUS Right 2020    Procedure: ORIF, FRACTURE, RADIUS;  Surgeon: Saul Wynn MD;  Location: [facility] OR;  Service: Orthopedics;  Laterality: Right;  Synthes, needs compression screws BENJAMIN SANTIAGO NOTIFIED    OSTEOTOMY OF ZYGOMATIC BONE AND ORBIT      TONSILLECTOMY      VENOGRAM, CATH LAB  2022    Procedure: Venogram, Cath Lab;  Surgeon: Juan Dejesus MD;  Location: [facility] CATH/EP LAB;  Service: Cardiology;;       Social History     Socioeconomic History    Marital status:    Tobacco Use    Smoking status: Former     Current packs/day: 0.00     Average packs/day: 1 pack/day for 10.0 years (10.0 ttl pk-yrs)     Types: Cigarettes     Start date: 2012     Quit date: 2022     Years since quittin.3    Smokeless tobacco: Never   Substance and Sexual Activity    Alcohol use: Not Currently    Drug use: Never     Social Drivers of Health     Financial Resource Strain: Low Risk  (2024)    Overall Financial Resource Strain (CARDIA)     Difficulty of Paying Living Expenses: Not hard at all   Food Insecurity: No Food Insecurity (2024)     Hunger Vital Sign     Worried About Running Out of Food in the Last Year: Never true     Ran Out of Food in the Last Year: Never true   Transportation Needs: No Transportation Needs (12/16/2024)    TRANSPORTATION NEEDS     Transportation : No   Physical Activity: Sufficiently Active (9/16/2024)    Exercise Vital Sign     Days of Exercise per Week: 5 days     Minutes of Exercise per Session: 30 min   Stress: No Stress Concern Present (12/16/2024)    [employer] [place] [employer] - Occupational Stress Questionnaire     Feeling of Stress : Not at all   Housing Stability: Low Risk  (12/16/2024)    Housing Stability Vital Sign     Unable to Pay for Housing in the Last Year: No     Homeless in the Last Year: No       Family History   Problem Relation Name Age of Onset    Arthritis Mother         Physical Exam:    Vitals:    12/16/24 1917   BP: (!) 156/83   Pulse: 76   Resp: 20   Temp: 98.2 °F (36.8 °C)     GENERAL:  No apparent distress.  Alert.    HEENT:  Moist mucous membranes.  Normocephalic and atraumatic.    NECK:  No swelling.  Midline trachea.   CARDIOVASCULAR:  Regular rate and rhythm.  2+ radial pulses.    PULMONARY:  Lungs clear to auscultation bilaterally.  No wheezes, rales, or rhonci.  Unlabored respirations.  ABDOMEN:  Non-tender and non-distended.    EXTREMITIES:  Warm and well perfused.  Brisk capillary refill.  Trace pitting pedal edema.  NEUROLOGICAL:  Normal mental status.  Appropriate and conversant.    SKIN:  No rashes or ecchymoses.    BACK:  Atraumatic.  No CVA tenderness to palpation.     RECTAL:  Multiple skin tags, no hemorrhoids or tenderness externally.    Labs Reviewed   CBC W/ AUTO DIFFERENTIAL - Abnormal       Result Value    WBC 8.90      RBC 4.13 (*)     Hemoglobin 12.9 (*)     Hematocrit 38.4 (*)     MCV 93      MCH 31.2 (*)     MCHC 33.6      RDW 12.3      Platelets 328      MPV 9.9      Immature Granulocytes 0.3      Gran # (ANC) 5.3      Immature Grans (Abs) 0.03      Lymph  # 2.4      Mono # 0.8      Eos # 0.3      Baso # 0.06      nRBC 0      Gran % 59.8      Lymph % 27.2      Mono % 8.7      Eosinophil % 3.3      Basophil % 0.7      Differential Method Automated     COMPREHENSIVE METABOLIC PANEL - Abnormal    Sodium 135 (*)     Potassium 4.1      Chloride 102      CO2 26      Glucose 124 (*)     BUN 14      Creatinine 0.7      Calcium 9.5      Total Protein 7.2      Albumin 4.6      Total Bilirubin 0.7      Alkaline Phosphatase 73      AST 16      ALT 16      eGFR >60.0      Anion Gap 7 (*)    TYPE & SCREEN    Group & Rh O POS      Indirect Syed NEG      Specimen Outdate 12/19/2024 23:59         Current Discharge Medication List        CONTINUE these medications which have NOT CHANGED    Details   apixaban (ELIQUIS) 5 mg Tab Take 1 tablet (5 mg total) by mouth 2 (two) times daily.  Qty: 180 tablet, Refills: 3      carvediloL (COREG) 6.25 MG tablet Take 1 tablet (6.25 mg total) by mouth 2 (two) times daily.  Qty: 180 tablet, Refills: 3    Comments: .      colchicine (COLCRYS) 0.6 mg tablet Day 1 take 1.2 mg (2 tabs) then take 0.6 mg ( 1 tab) one hour later. Day 2 through 5 take 0.6 mg (1 tab) daily until flare resolves.  Qty: 7 tablet, Refills: 0    Associated Diagnoses: Acute gout of left foot, unspecified cause      furosemide (LASIX) 20 MG tablet Take 1 tablet (20 mg total) by mouth every other day.  Qty: 45 tablet, Refills: 3      sacubitriL-valsartan (ENTRESTO) 24-26 mg per tablet Take 1 tablet by mouth 2 (two) times daily.  Qty: 180 tablet, Refills: 3      spironolactone (ALDACTONE) 25 MG tablet Take 1 tablet (25 mg total) by mouth once daily.  Qty: 90 tablet, Refills: 3    Comments: .             Orders Placed This Encounter   Procedures    CTA Acute GI [place], Abdomen and Pelvis    CBC auto differential    Comprehensive metabolic panel    CBC auto differential    CBC auto differential    Comprehensive metabolic panel    Diet Clear Liquid    Diet NPO Except for: Sips with  Medication    Cardiac Monitoring - Adult    Vital signs    Orthostatic blood pressure With vital signs    Check Type and Screen complete    Place sequential compression device    Check consent to blood transfusion    Full code    Inpatient consult to Gastroenterology    Type & Screen    Insert Saline lock IV    Large Bore IV Access    Possible Hospitalization    Place in Observation       Imaging Results              CTA Acute GI [place], Abdomen and Pelvis (Final result)  Result time 12/16/24 19:29:25      Final result by Ward Olvera MD (12/16/24 19:29:25)                   Impression:      No definite CTA evidence of active gastrointestinal hemorrhage on this examination.  Scattered hyperdense material is present throughout the colon limiting sensitivity as discussed above.    Colonic diverticulosis without definite evidence of acute diverticulitis at this time.    Nonspecific enlarged 2.3 cm right-sided retroperitoneal lymph node.  This is of uncertain etiology and clinical significance.  No additional bulky lymph node enlargement appreciated.    Hepatomegaly with probable hepatic steatosis.    Cholelithiasis.    Punctate nonobstructing left-sided nephrolithiasis.    Additional findings as above.      Electronically signed by: Ward Olvera MD  Date:    12/16/2024  Time:    19:29               Narrative:    EXAMINATION:  CTA ACUTE GI BLEED, ABDOMEN AND PELVIS    CLINICAL HISTORY:  gi bleed;    TECHNIQUE:  2 mm axial images were acquired of the abdomen and pelvis before and after the administration of 100 cc Omnipaque 350 IV contrast.  Sagittal and coronal reformatted images were reviewed.    COMPARISON:  Ultrasound abdomen 12/02/2022    FINDINGS:  The lung bases are unremarkable.  There is no pleural fluid present.  There are transvenous pacing leads present.  No significant pericardial effusion.    The liver is enlarged.  No focal hepatic abnormality appreciated on this limited noncontrast examination.   There is probable hepatic steatosis.  There is cholelithiasis.  No definite CT evidence of acute cholecystitis.  There is no intra-or extrahepatic biliary ductal dilatation.    Stomach appears within normal limits.  There are scattered calcified splenic granuloma.  There is a small splenule present.  The adrenal glands appear within normal limits.  Pancreas demonstrates no acute abnormality.    The kidneys are normal in size and location and enhance symmetrically.  There is punctate nonobstructing left-sided nephrolithiasis.  There is a subcentimeter left renal cortical hypodensity, too small to accurately characterize.  There is no significant hydronephrosis.  Urinary bladder is decompressed limiting assessment.  The prostate is unremarkable.  There are small bilateral fat containing inguinal hernias.  No significant free fluid in the pelvis.    The abdominal aorta is normal in course and caliber with mild atherosclerotic calcification along its course.  The major branch vessels are patent without evidence of hemodynamically significant stenosis or occlusion.    The visualized loops of small and large bowel show no evidence of obstruction or inflammation. There is colonic diverticulosis without evidence of acute diverticulitis.  There is scattered hyperdense material within the colon limiting sensitivity.  There is hyperdense material present within the cecum although this is present on the initial noncontrast series and does not appear to significantly progressed on the arteria or l delayed phase.  Additional scattered hyperdense colonic diverticula present.  No definite intraluminal extravasation of IV contrast material or pooling on the delayed phase appreciated to suggest active GI hemorrhage.  The appendix is not definitively visualized.  There is no free intraperitoneal air, portal venous gas, or ascites.  There is a small fat containing umbilical hernia.    There is a nonspecific enlarged right-sided  retroperitoneal lymph node adjacent to the IVC at the level of the inferior right kidney measuring 2.2 x 2.3 cm (axial series 7, image 712).  There are scattered shotty small mesenteric lymph nodes.  No additional bulky lymph node enlargement appreciated.    Osseous structures demonstrate mild degenerative changes.  No acute fracture appreciated.  The extraperitoneal soft tissues are unremarkable.                                           MDM:    63 y.o. male with bright red blood per rectum since yesterday with bloody stools.  Patient high-risk based on present anticoagulation status, although last dose of apixaban was approximately 24 hours ago.  He appears hemodynamically stable.  There is no chest pain or dyspnea with very low suspicion for any immediate cardiopulmonary decompensation.  Laboratories including CBC or hemoglobin sent o assess for decline.  He is high-risk based on comorbidities.  Wide differential with etiologies of lower GI bleed including AVM, polyps, diverticulosis among many others discussed with patient.  Patient doing well with mild decline in hemoglobin compared to historical baseline that may be trended.  I do not think he requires transfusion or other emergent intervention at this point.  I will admit to Hospital Medicine for observation.  He is to continue to hold apixaban for now.    Diagnoses:    1. Lower GIB       Ward Juarez MD  12/16/24 2009

## 2024-12-16 NOTE — HPI
63 year old pt getting admitted with lower GI bleed  Pt is on eliquis and started having multiple episodes of loose stools with blood yesterday  It was dark blood yesterday and today he had several episodes of passing fresh blood from rectum  Pt came to ER and got admitted

## 2024-12-16 NOTE — ASSESSMENT & PLAN NOTE
Consulted GI MD  Stop NOAC  Start on iv PPI in view with dark blood stool h/o yesterday  NPO from midnight

## 2024-12-16 NOTE — ASSESSMENT & PLAN NOTE
Patient has permanent atrial fibrillation. Patient is currently in sinus rhythm. [id number] Score: The patient doesn't have any registry metric data available. The patients heart rate in the last 24 hours is as follows:  Pulse  Min: 72  Max: 86       Anticoagulants    Hold eliquis

## 2024-12-16 NOTE — ASSESSMENT & PLAN NOTE
"  No results for input(s): "BNP" in the last 72 hours.  Latest ECHO  Results for orders placed during the hospital encounter of 11/10/22    Echo    Interpretation Summary  · The left ventricle is mildly enlarged with concentric remodeling and moderately decreased systolic function.  · The estimated ejection fraction is 30%.  · Grade I left ventricular diastolic dysfunction.  · There is left ventricular global hypokinesis.  · Normal right ventricular size with normal right ventricular systolic function.  · Mild left atrial enlargement.  · Mild mitral regurgitation.  · Mild tricuspid regurgitation.  · Normal central venous pressure (3 mmHg).  · The estimated PA systolic pressure is 24 mmHg.    Current Heart Failure Medications     Hold lasix/entresto/aldactone at the moment     "

## 2024-12-16 NOTE — ASSESSMENT & PLAN NOTE
Patient's blood pressure range in the last 24 hours was: BP  Min: 121/90  Max: 145/93.The patient's inpatient anti-hypertensive regimen is listed below:  Current Antihypertensives       Stable

## 2024-12-16 NOTE — CONSULTS
GASTROENTEROLOGY INPATIENT CONSULT NOTE  Patient Name: Riana Beltran  Patient MRN: [id number]  Patient : 1960    Admit Date: 2024  Service date: 2024    Reason for Consult: gi bleed    PCP: No, Primary Doctor    Chief Complaint   Patient presents with    GI Bleeding     ONSET YESTERDAY AM. BLOODY DIARRHEA         HPI: Patient is Riana Beltran is a 63 y.o. male with hx PAF on apixaban, AICD, CHF, NICMO (EF 30% ) presenting with apparently one day of multiple episodes of loose stools with bright red blood.  This began yesterday.  He denies other symptoms.  Last dose of apixaban was yesterday morning.  He denies emesis.  No associated chest pain, shortness of breath, orthopnea, lower extremity edema.  No other bleeding or history of previous GI bleed.  He does have history of hemorrhoids with no current rectal pain.     Patient reports that he drinks wine and previously had drank higher doses in the past.  Ultrasound from  reviewed with the patient showing gallstones and steatosis of the liver       CHART REVIEW:  No prior colonoscopy    Past Medical History:  Past Medical History:   Diagnosis Date    A-fib     Back pain     Bilateral radial fractures     Broken ribs 2020    Celiac disease     HF (heart failure)     HTN (hypertension)     Neck pain         Past Surgical History:  Past Surgical History:   Procedure Laterality Date    ANKLE SURGERY Right     APPENDECTOMY      INSERTION, ICD, DUAL CHAMBER Left 2022    Procedure: Insertion, ICD, Dual Chamber;  Surgeon: Juan Dejesus MD;  Location: [facility] CATH/EP LAB;  Service: Cardiology;  Laterality: Left;    KNEE SURGERY Left     patella realignment    LATERAL EPICONDYLE RELEASE Right 2020    Procedure: RELEASE, ELBOW, LATERAL EPICONDYLE;  Surgeon: Saul Wynn MD;  Location: [facility] OR;  Service: Orthopedics;  Laterality: Right;    LEFT HEART CATHETERIZATION Left 2022    Procedure: Left heart  cath;  Surgeon: Jamel Dejesus MD;  Location: [facility] CATH/EP LAB;  Service: Cardiology;  Laterality: Left;    NECK SURGERY      laminectomy    OPEN REDUCTION AND INTERNAL FIXATION (ORIF) OF FRACTURE OF RADIUS Right 2020    Procedure: ORIF, FRACTURE, RADIUS;  Surgeon: Saul Wynn MD;  Location: [facility] OR;  Service: Orthopedics;  Laterality: Right;  Synthes, needs compression screws BENJAMIN SANTIAGO NOTIFIED    OSTEOTOMY OF ZYGOMATIC BONE AND ORBIT      TONSILLECTOMY      VENOGRAM, CATH LAB  2022    Procedure: Venogram, Cath Lab;  Surgeon: Juan Dejesus MD;  Location: [facility] CATH/EP LAB;  Service: Cardiology;;        Home Medications:  (Not in a hospital admission)      Inpatient Medications:  Review of patient's allergies indicates:  No Known Allergies    Social History:   Social History     Occupational History    Not on file   Tobacco Use    Smoking status: Former     Current packs/day: 0.00     Average packs/day: 1 pack/day for 10.0 years (10.0 ttl pk-yrs)     Types: Cigarettes     Start date: 2012     Quit date: 2022     Years since quittin.3    Smokeless tobacco: Never   Substance and Sexual Activity    Alcohol use: Not Currently    Drug use: Never    Sexual activity: Not on file       Family History:   No family history on file.    Review of Systems:  GENERAL: No fever, chills, weight loss  SKIN: No rashes, jaundice, pruritus  HEENT: No rhinorrhea, epistaxis, vision changes.  No trauma, tinnitus, lymphadenopathy or pharyngitis  CV: No chest pain, palpitations, edima or MART  PULM: No cough or sputum production.  No wheezing.  GI: AS IN HPI  URINARY:  No hematuria, dysuria  MS: No change in muscle or joint pain, weakness, or ROM  Neuro: No focal neurologic changes  PSYCH: No change in mood or personality.  No suicidal ideation.  ENDOCRINE: No fatigue      OBJECTIVE:    Vitals:    24 1051   BP: (!) 138/93   BP Location: Left arm   Pulse: 76   Resp: 18   Temp: 98.7 °F (37.1 °C)  "  TempSrc: Oral   SpO2: 96%   Weight: 106.6 kg (235 lb)   Height: 6' (1.829 m)     Physical Exam:    GEN: well-developed, well-nourished, awake and alert, non-toxic appearing adult  HEENT: PERRL, sclera anicteric, oral mucosa pink and moist without lesion  NECK: trachea midline; Good ROM  CV: regular rate and rhythm, no murmurs or gallops  RESP: clear to auscultation bilaterally, no wheezes, rhonci or rales  ABD: soft, non-tender, non-distended, normal bowel sounds  EXT: no swelling or edema, 2+ pulses distally  SKIN: no rashes or jaundice  PSYCH: normal affect    Labs:   Recent Labs   Lab 12/16/24  1147   WBC 8.90   HGB 12.9*      MCV 93     No results for input(s): "IRON", "FERRITIN" in the last 168 hours.    Invalid input(s): "IRONSAT"  Recent Labs   Lab 12/16/24  1147   *   K 4.1   BUN 14   CREATININE 0.7   ALBUMIN 4.6   AST 16   ALT 16   ALKPHOS 73            Radiology Review:  Imaging Results    None             IMPRESSION / RECOMMENDATIONS:   Active Problem List with Overview Notes    Diagnosis Date Noted    Lower GI bleed 12/16/2024    CHF (congestive heart failure) 12/16/2024    False positive stress test 09/01/2022    LV dysfunction 08/24/2022    Shortness of breath 08/24/2022    Paroxysmal atrial fibrillation 08/24/2022    Essential hypertension 08/24/2022    Systolic congestive heart failure 08/24/2022    Nonspecific abnormal electrocardiogram (ECG) (EKG) 08/24/2022    Traumatic closed nondisplaced fracture of head of radius, right, initial encounter 03/04/2020      63 y.o. male with hx PAF on apixaban, AICD, CHF, NICMO (EF 30% 11/22 )  Steatosis of the liver  Lower GI bleed CTA GI bleeding protocol and colonoscopy in a.m. with intervention as warranted  RIsks, benefits, alternatives discussed in detail regarding upcoming procedures and sedation and possible complications. Some of the more common endoscopic complications include but not limited to immediate or delayed perforation, bleeding, " infections, pain, inadvertent injury to surrounding tissue / organs and possible need for surgical evaluation. All questions answered and will proceed with procedure as planned.      Thank you for this consult.    Zayra Duque  12/16/2024  3:52 PM

## 2024-12-16 NOTE — H&P
Formerly [facility], [facility]–1983 - Emergency Dept  [facility] Medicine  History & Physical    Patient Name: Riana Beltran  MRN: [id number]  Patient Class: OP- Observation  Admission Date: 12/16/2024  Attending Physician: Marco Puente MD   Primary Care Provider: Kylee Primary Doctor         Patient information was obtained from patient, past medical records, and ER records.     Subjective:     Principal Problem:Lower GI bleed    Chief Complaint:   Chief Complaint   Patient presents with    GI Bleeding     ONSET YESTERDAY AM. BLOODY DIARRHEA          HPI: 63 year old pt getting admitted with lower GI bleed  Pt is on eliquis and started having multiple episodes of loose stools with blood yesterday  It was dark blood yesterday and today he had several episodes of passing fresh blood from rectum  Pt came to ER and got admitted    Past Medical History:   Diagnosis Date    A-fib     Back pain 2020    Bilateral radial fractures 2020    Broken ribs 02/29/2020    Celiac disease     HF (heart failure)     HTN (hypertension)     Neck pain 2020       Past Surgical History:   Procedure Laterality Date    ANKLE SURGERY Right     APPENDECTOMY      INSERTION, ICD, DUAL CHAMBER Left 12/13/2022    Procedure: Insertion, ICD, Dual Chamber;  Surgeon: Juan Dejesus MD;  Location: [facility] CATH/EP LAB;  Service: Cardiology;  Laterality: Left;    KNEE SURGERY Left     patella realignment    LATERAL EPICONDYLE RELEASE Right 03/11/2020    Procedure: RELEASE, ELBOW, LATERAL EPICONDYLE;  Surgeon: Saul Wynn MD;  Location: [facility] OR;  Service: Orthopedics;  Laterality: Right;    LEFT HEART CATHETERIZATION Left 09/14/2022    Procedure: Left heart cath;  Surgeon: Jamel Dejesus MD;  Location: [facility] CATH/EP LAB;  Service: Cardiology;  Laterality: Left;    NECK SURGERY      laminectomy    OPEN REDUCTION AND INTERNAL FIXATION (ORIF) OF FRACTURE OF RADIUS Right 03/11/2020    Procedure: ORIF, FRACTURE, RADIUS;  Surgeon: Saul Wynn MD;  Location: [facility]  OR;  Service: Orthopedics;  Laterality: Right;  Synthes, needs compression screws BENJAMIN SANTIAGO NOTIFIED    OSTEOTOMY OF ZYGOMATIC BONE AND ORBIT      TONSILLECTOMY      VENOGRAM, CATH LAB  2022    Procedure: Venogram, Cath Lab;  Surgeon: Juan Dejesus MD;  Location: [facility] CATH/EP LAB;  Service: Cardiology;;       Review of patient's allergies indicates:  No Known Allergies    No current facility-administered medications on file prior to encounter.     Current Outpatient Medications on File Prior to Encounter   Medication Sig    apixaban (ELIQUIS) 5 mg Tab Take 1 tablet (5 mg total) by mouth 2 (two) times daily.    carvediloL (COREG) 6.25 MG tablet Take 1 tablet (6.25 mg total) by mouth 2 (two) times daily.    colchicine (COLCRYS) 0.6 mg tablet Day 1 take 1.2 mg (2 tabs) then take 0.6 mg ( 1 tab) one hour later. Day 2 through 5 take 0.6 mg (1 tab) daily until flare resolves.    furosemide (LASIX) 20 MG tablet Take 1 tablet (20 mg total) by mouth every other day.    sacubitriL-valsartan (ENTRESTO) 24-26 mg per tablet Take 1 tablet by mouth 2 (two) times daily.    spironolactone (ALDACTONE) 25 MG tablet Take 1 tablet (25 mg total) by mouth once daily.    [DISCONTINUED] colchicine, gout, (COLCRYS) 0.6 mg tablet Day 1: Oral: 1.2 mg at once, followed by 0.6 mg after 1 hour. Day 2 and thereafter: Oral: 0.6 mg once or twice daily until flare resolves (Patient not taking: Reported on 10/27/2024)     Family History       Problem Relation (Age of Onset)    Arthritis Mother          Tobacco Use    Smoking status: Former     Current packs/day: 0.00     Average packs/day: 1 pack/day for 10.0 years (10.0 ttl pk-yrs)     Types: Cigarettes     Start date: 2012     Quit date: 2022     Years since quittin.3    Smokeless tobacco: Never   Substance and Sexual Activity    Alcohol use: Not Currently    Drug use: Never    Sexual activity: Not on file     Review of Systems   Constitutional:  Negative for activity change  and appetite change.   HENT:  Negative for congestion and dental problem.    Eyes:  Negative for discharge and itching.   Respiratory:  Negative for shortness of breath.    Cardiovascular:  Negative for chest pain.   Gastrointestinal:  Positive for blood in stool. Negative for abdominal distention and abdominal pain.   Endocrine: Negative for cold intolerance.   Genitourinary:  Negative for difficulty urinating and dysuria.   Musculoskeletal:  Negative for arthralgias and back pain.   Skin:  Negative for color change.   Neurological:  Negative for dizziness and facial asymmetry.   Hematological:  Negative for adenopathy.   Psychiatric/Behavioral:  Negative for agitation and behavioral problems.      Objective:     Vital Signs (Most Recent):  Temp: 98.7 °F (37.1 °C) (12/16/24 1051)  Pulse: 86 (12/16/24 1311)  Resp: 20 (12/16/24 1311)  BP: (!) 121/90 (12/16/24 1301)  SpO2: 96 % (12/16/24 1311) Vital Signs (24h Range):  Temp:  [98.7 °F (37.1 °C)] 98.7 °F (37.1 °C)  Pulse:  [72-86] 86  Resp:  [18-22] 20  SpO2:  [95 %-99 %] 96 %  BP: (121-145)/(89-98) 121/90     Weight: 106.6 kg (235 lb)  Body mass index is 31.87 kg/m².     Physical Exam  Vitals and nursing note reviewed.   Constitutional:       Appearance: He is well-developed.   HENT:      Head: Atraumatic.      Right Ear: External ear normal.      Left Ear: External ear normal.      Nose: Nose normal.      Mouth/Throat:      Mouth: Mucous membranes are moist.   Eyes:      Extraocular Movements: Extraocular movements intact.   Cardiovascular:      Rate and Rhythm: Normal rate.   Pulmonary:      Effort: Pulmonary effort is normal.      Breath sounds: Normal breath sounds.   Musculoskeletal:         General: Normal range of motion.      Cervical back: Full passive range of motion without pain and normal range of motion.   Skin:     General: Skin is warm.   Neurological:      Mental Status: He is alert and oriented to person, place, and time.   Psychiatric:          "Behavior: Behavior normal.                Significant Labs: All pertinent labs within the past 24 hours have been reviewed.  CBC:   Recent Labs   Lab 12/16/24  1147   WBC 8.90   HGB 12.9*   HCT 38.4*        CMP:   Recent Labs   Lab 12/16/24  1147   *   K 4.1      CO2 26   *   BUN 14   CREATININE 0.7   CALCIUM 9.5   PROT 7.2   ALBUMIN 4.6   BILITOT 0.7   ALKPHOS 73   AST 16   ALT 16   ANIONGAP 7*       Significant Imaging: I have reviewed all pertinent imaging results/findings within the past 24 hours.    Assessment/Plan:     * Lower GI bleed  Consulted GI MD  Stop NOAC  Start on iv PPI in view with dark blood stool h/o yesterday  NPO from midnight    CHF (congestive heart failure)    No results for input(s): "BNP" in the last 72 hours.  Latest ECHO  Results for orders placed during the hospital encounter of 11/10/22    Echo    Interpretation Summary  · The left ventricle is mildly enlarged with concentric remodeling and moderately decreased systolic function.  · The estimated ejection fraction is 30%.  · Grade I left ventricular diastolic dysfunction.  · There is left ventricular global hypokinesis.  · Normal right ventricular size with normal right ventricular systolic function.  · Mild left atrial enlargement.  · Mild mitral regurgitation.  · Mild tricuspid regurgitation.  · Normal central venous pressure (3 mmHg).  · The estimated PA systolic pressure is 24 mmHg.    Current Heart Failure Medications     Hold lasix/entresto/aldactone at the moment       Essential hypertension  Patient's blood pressure range in the last 24 hours was: BP  Min: 121/90  Max: 145/93.The patient's inpatient anti-hypertensive regimen is listed below:  Current Antihypertensives       Stable     Paroxysmal atrial fibrillation  Patient has permanent atrial fibrillation. Patient is currently in sinus rhythm. [id number] Score: The patient doesn't have any registry metric data available. The patients heart rate in the " last 24 hours is as follows:  Pulse  Min: 72  Max: 86       Anticoagulants    Hold eliquis            VTE Risk Mitigation (From admission, onward)           Ordered     IP VTE HIGH RISK PATIENT  Once         12/16/24 1359     Place sequential compression device  Until discontinued         12/16/24 1359                                    Marco Puente MD  Department of Hospital Medicine  [facility] - Emergency Dept

## 2024-12-17 ENCOUNTER — ANESTHESIA (OUTPATIENT)
Dept: SURGERY | Facility: HOSPITAL | Age: 64
DRG: 398 | End: 2024-12-17
Payer: COMMERCIAL

## 2024-12-17 ENCOUNTER — ANESTHESIA EVENT (OUTPATIENT)
Dept: SURGERY | Facility: HOSPITAL | Age: 64
DRG: 398 | End: 2024-12-17
Payer: COMMERCIAL

## 2024-12-17 VITALS
BODY MASS INDEX: 29.6 KG/M2 | OXYGEN SATURATION: 97 % | HEIGHT: 73 IN | SYSTOLIC BLOOD PRESSURE: 143 MMHG | RESPIRATION RATE: 17 BRPM | TEMPERATURE: 98 F | DIASTOLIC BLOOD PRESSURE: 97 MMHG | HEART RATE: 68 BPM | WEIGHT: 223.38 LBS

## 2024-12-17 LAB
BASOPHILS # BLD AUTO: 0.04 K/UL (ref 0–0.2)
BASOPHILS # BLD AUTO: 0.06 K/UL (ref 0–0.2)
BASOPHILS NFR BLD: 0.6 % (ref 0–1.9)
BASOPHILS NFR BLD: 0.7 % (ref 0–1.9)
DIFFERENTIAL METHOD BLD: ABNORMAL
DIFFERENTIAL METHOD BLD: ABNORMAL
EOSINOPHIL # BLD AUTO: 0.3 K/UL (ref 0–0.5)
EOSINOPHIL # BLD AUTO: 0.3 K/UL (ref 0–0.5)
EOSINOPHIL NFR BLD: 4 % (ref 0–8)
EOSINOPHIL NFR BLD: 4.1 % (ref 0–8)
ERYTHROCYTE [DISTWIDTH] IN BLOOD BY AUTOMATED COUNT: 12 % (ref 11.5–14.5)
ERYTHROCYTE [DISTWIDTH] IN BLOOD BY AUTOMATED COUNT: 12.2 % (ref 11.5–14.5)
HCT VFR BLD AUTO: 32.2 % (ref 40–54)
HCT VFR BLD AUTO: 33 % (ref 40–54)
HGB BLD-MCNC: 11.1 G/DL (ref 14–18)
HGB BLD-MCNC: 11.1 G/DL (ref 14–18)
IMM GRANULOCYTES # BLD AUTO: 0.02 K/UL (ref 0–0.04)
IMM GRANULOCYTES # BLD AUTO: 0.03 K/UL (ref 0–0.04)
IMM GRANULOCYTES NFR BLD AUTO: 0.2 % (ref 0–0.5)
IMM GRANULOCYTES NFR BLD AUTO: 0.4 % (ref 0–0.5)
LYMPHOCYTES # BLD AUTO: 2 K/UL (ref 1–4.8)
LYMPHOCYTES # BLD AUTO: 2.5 K/UL (ref 1–4.8)
LYMPHOCYTES NFR BLD: 28.2 % (ref 18–48)
LYMPHOCYTES NFR BLD: 29 % (ref 18–48)
MCH RBC QN AUTO: 31 PG (ref 27–31)
MCH RBC QN AUTO: 32.2 PG (ref 27–31)
MCHC RBC AUTO-ENTMCNC: 33.6 G/DL (ref 32–36)
MCHC RBC AUTO-ENTMCNC: 34.5 G/DL (ref 32–36)
MCV RBC AUTO: 92 FL (ref 82–98)
MCV RBC AUTO: 93 FL (ref 82–98)
MONOCYTES # BLD AUTO: 0.7 K/UL (ref 0.3–1)
MONOCYTES # BLD AUTO: 0.9 K/UL (ref 0.3–1)
MONOCYTES NFR BLD: 10.3 % (ref 4–15)
MONOCYTES NFR BLD: 9.7 % (ref 4–15)
NEUTROPHILS # BLD AUTO: 4.1 K/UL (ref 1.8–7.7)
NEUTROPHILS # BLD AUTO: 4.7 K/UL (ref 1.8–7.7)
NEUTROPHILS NFR BLD: 55.8 % (ref 38–73)
NEUTROPHILS NFR BLD: 57 % (ref 38–73)
NRBC BLD-RTO: 0 /100 WBC
NRBC BLD-RTO: 0 /100 WBC
PLATELET # BLD AUTO: 263 K/UL (ref 150–450)
PLATELET # BLD AUTO: 271 K/UL (ref 150–450)
PMV BLD AUTO: 10.1 FL (ref 9.2–12.9)
PMV BLD AUTO: 10.1 FL (ref 9.2–12.9)
RBC # BLD AUTO: 3.45 M/UL (ref 4.6–6.2)
RBC # BLD AUTO: 3.58 M/UL (ref 4.6–6.2)
WBC # BLD AUTO: 7.12 K/UL (ref 3.9–12.7)
WBC # BLD AUTO: 8.47 K/UL (ref 3.9–12.7)

## 2024-12-17 PROCEDURE — 0DBL8ZZ EXCISION OF TRANSVERSE COLON, VIA NATURAL OR ARTIFICIAL OPENING ENDOSCOPIC: ICD-10-PCS | Performed by: INTERNAL MEDICINE

## 2024-12-17 PROCEDURE — 12000002 HC ACUTE/MED SURGE SEMI-PRIVATE ROOM

## 2024-12-17 PROCEDURE — 96376 TX/PRO/DX INJ SAME DRUG ADON: CPT

## 2024-12-17 PROCEDURE — 3E0H8KZ INTRODUCTION OF OTHER DIAGNOSTIC SUBSTANCE INTO LOWER GI, VIA NATURAL OR ARTIFICIAL OPENING ENDOSCOPIC: ICD-10-PCS | Performed by: INTERNAL MEDICINE

## 2024-12-17 PROCEDURE — 63600175 PHARM REV CODE 636 W HCPCS

## 2024-12-17 PROCEDURE — 27201042 HC RETRIEVAL NET: Performed by: INTERNAL MEDICINE

## 2024-12-17 PROCEDURE — 25000003 PHARM REV CODE 250: Performed by: INTERNAL MEDICINE

## 2024-12-17 PROCEDURE — 27200043 HC FORCEPS, BIOPSY: Performed by: INTERNAL MEDICINE

## 2024-12-17 PROCEDURE — 85025 COMPLETE CBC W/AUTO DIFF WBC: CPT | Performed by: INTERNAL MEDICINE

## 2024-12-17 PROCEDURE — 37000008 HC ANESTHESIA 1ST 15 MINUTES: Performed by: INTERNAL MEDICINE

## 2024-12-17 PROCEDURE — 45385 COLONOSCOPY W/LESION REMOVAL: CPT | Performed by: INTERNAL MEDICINE

## 2024-12-17 PROCEDURE — 06LY8CC OCCLUSION OF HEMORRHOIDAL PLEXUS WITH EXTRALUMINAL DEVICE, VIA NATURAL OR ARTIFICIAL OPENING ENDOSCOPIC: ICD-10-PCS | Performed by: INTERNAL MEDICINE

## 2024-12-17 PROCEDURE — 63600175 PHARM REV CODE 636 W HCPCS: Performed by: INTERNAL MEDICINE

## 2024-12-17 PROCEDURE — 25000003 PHARM REV CODE 250

## 2024-12-17 PROCEDURE — 63600175 PHARM REV CODE 636 W HCPCS: Performed by: ANESTHESIOLOGY

## 2024-12-17 PROCEDURE — 27202438 HC MUCOSAL LIFTING AGENT: Performed by: INTERNAL MEDICINE

## 2024-12-17 PROCEDURE — 45390 COLONOSCOPY W/RESECTION: CPT | Mod: 59 | Performed by: INTERNAL MEDICINE

## 2024-12-17 PROCEDURE — 27201022: Performed by: INTERNAL MEDICINE

## 2024-12-17 PROCEDURE — 0DBP8ZZ EXCISION OF RECTUM, VIA NATURAL OR ARTIFICIAL OPENING ENDOSCOPIC: ICD-10-PCS | Performed by: INTERNAL MEDICINE

## 2024-12-17 PROCEDURE — 36415 COLL VENOUS BLD VENIPUNCTURE: CPT | Performed by: INTERNAL MEDICINE

## 2024-12-17 PROCEDURE — 0DBJ8ZX EXCISION OF APPENDIX, VIA NATURAL OR ARTIFICIAL OPENING ENDOSCOPIC, DIAGNOSTIC: ICD-10-PCS | Performed by: INTERNAL MEDICINE

## 2024-12-17 PROCEDURE — 37000009 HC ANESTHESIA EA ADD 15 MINS: Performed by: INTERNAL MEDICINE

## 2024-12-17 PROCEDURE — 27201028 HC NEEDLE, SCLERO: Performed by: INTERNAL MEDICINE

## 2024-12-17 PROCEDURE — 27201114 HC TRAP (ANY): Performed by: INTERNAL MEDICINE

## 2024-12-17 PROCEDURE — 45380 COLONOSCOPY AND BIOPSY: CPT | Mod: 59 | Performed by: INTERNAL MEDICINE

## 2024-12-17 PROCEDURE — 27200997: Performed by: INTERNAL MEDICINE

## 2024-12-17 PROCEDURE — 27201089 HC SNARE, DISP (ANY): Performed by: INTERNAL MEDICINE

## 2024-12-17 PROCEDURE — 46221 LIGATION OF HEMORRHOID(S): CPT | Performed by: INTERNAL MEDICINE

## 2024-12-17 RX ORDER — OXYCODONE AND ACETAMINOPHEN 7.5; 325 MG/1; MG/1
1 TABLET ORAL EVERY 6 HOURS PRN
Qty: 28 TABLET | Refills: 0 | Status: SHIPPED | OUTPATIENT
Start: 2024-12-17 | End: 2024-12-24

## 2024-12-17 RX ORDER — LIDOCAINE HYDROCHLORIDE 20 MG/ML
JELLY TOPICAL ONCE
Status: COMPLETED | OUTPATIENT
Start: 2024-12-17 | End: 2024-12-17

## 2024-12-17 RX ORDER — PROPOFOL 10 MG/ML
VIAL (ML) INTRAVENOUS
Status: DISCONTINUED | OUTPATIENT
Start: 2024-12-17 | End: 2024-12-17

## 2024-12-17 RX ORDER — KETAMINE HCL IN 0.9 % NACL 50 MG/5 ML
SYRINGE (ML) INTRAVENOUS
Status: DISCONTINUED | OUTPATIENT
Start: 2024-12-17 | End: 2024-12-17

## 2024-12-17 RX ORDER — DEXTROMETHORPHAN/PSEUDOEPHED 2.5-7.5/.8
DROPS ORAL
Status: COMPLETED | OUTPATIENT
Start: 2024-12-17 | End: 2024-12-17

## 2024-12-17 RX ORDER — FENTANYL CITRATE 50 UG/ML
100 INJECTION, SOLUTION INTRAMUSCULAR; INTRAVENOUS ONCE
Status: COMPLETED | OUTPATIENT
Start: 2024-12-17 | End: 2024-12-17

## 2024-12-17 RX ORDER — ASPIRIN 81 MG/1
81 TABLET ORAL DAILY
Qty: 7 TABLET | Refills: 0 | Status: SHIPPED | OUTPATIENT
Start: 2024-12-17 | End: 2024-12-24

## 2024-12-17 RX ADMIN — PROPOFOL 30 MG: 10 INJECTION, EMULSION INTRAVENOUS at 01:12

## 2024-12-17 RX ADMIN — PROPOFOL 40 MG: 10 INJECTION, EMULSION INTRAVENOUS at 02:12

## 2024-12-17 RX ADMIN — PROPOFOL 50 MG: 10 INJECTION, EMULSION INTRAVENOUS at 01:12

## 2024-12-17 RX ADMIN — LIDOCAINE HYDROCHLORIDE 6 ML: 20 JELLY TOPICAL at 02:12

## 2024-12-17 RX ADMIN — Medication 30 MG: at 01:12

## 2024-12-17 RX ADMIN — PROPOFOL 40 MG: 10 INJECTION, EMULSION INTRAVENOUS at 01:12

## 2024-12-17 RX ADMIN — PANTOPRAZOLE SODIUM 40 MG: 40 INJECTION, POWDER, LYOPHILIZED, FOR SOLUTION INTRAVENOUS at 08:12

## 2024-12-17 RX ADMIN — FENTANYL CITRATE 50 MCG: 50 INJECTION, SOLUTION INTRAMUSCULAR; INTRAVENOUS at 02:12

## 2024-12-17 RX ADMIN — PROPOFOL 30 MG: 10 INJECTION, EMULSION INTRAVENOUS at 02:12

## 2024-12-17 NOTE — PLAN OF CARE
Problem: Gastrointestinal Bleeding  Goal: Optimal Coping with Acute Illness  Reactivated  Goal: Hemostasis  Reactivated     Problem: Adult Inpatient Plan of Care  Goal: Plan of Care Review  Reactivated  Goal: Patient-Specific Goal (Individualized)  Reactivated  Goal: Absence of Hospital-Acquired Illness or Injury  Reactivated  Goal: Optimal Comfort and Wellbeing  Reactivated  Goal: Readiness for Transition of Care  Reactivated     Problem: Skin Injury Risk Increased  Goal: Skin Health and Integrity  Reactivated

## 2024-12-17 NOTE — NURSING
Patient transferred to unit via wheelchair from [facility], patient ambulated into room, family by his side, discharge instructions given to him from [facility], diet ordered, call light within easy reach, SR up x 2.

## 2024-12-17 NOTE — TRANSFER OF CARE
"Anesthesia Transfer of Care Note    Patient: Riana Beltran    Procedure(s) Performed: Procedure(s) (LRB):  COLONOSCOPY (N/A)    Patient location: GI    Anesthesia Type: general    Transport from OR: Transported from OR on room air with adequate spontaneous ventilation    Post pain: adequate analgesia    Post assessment: no apparent anesthetic complications and tolerated procedure well    Post vital signs: stable    Level of consciousness: sedated    Nausea/Vomiting: no nausea/vomiting    Complications: none    Transfer of care protocol was followed      Last vitals: Visit Vitals  BP (!) 143/92   Pulse 76   Temp 37.4 °C (99.3 °F) (Temporal)   Resp 19   Ht 6' 1" (1.854 m)   Wt 101.3 kg (223 lb 6.4 oz)   SpO2 97%   BMI 29.47 kg/m²     "

## 2024-12-17 NOTE — NURSING
Patient discharged home per orders, all instructions reviewed with patient and patient verbalized understanding.  IV discontinued x 1, Tele box discontinued x 1.  Patient ambulated out of facility with staff by his side and all belongings by patients side.  Patient's wife to transport him home.

## 2024-12-17 NOTE — [employer] PATIENT INSTRUCTIONS
Discharge Summary/Instructions after an Endoscopic Procedure  Patient Name: Riana Beltran  Patient MRN: [id number]  Patient YOB: 1960 Tuesday, December 17, 2024  Fadi Langley III, MD  RESTRICTIONS:  During your procedure today, you received medications for sedation.  These   medications may affect your judgment, balance and coordination.  Therefore,   for 24 hours, you have the following restrictions:   - DO NOT drive a car, operate machinery, make legal/financial decisions,   sign important papers or drink alcohol.    ACTIVITY:  Today: no heavy lifting, straining or running due to procedural   sedation/anesthesia.  The following day: return to full activity including work.  DIET:  Eat and drink normally unless instructed otherwise.     TREATMENT FOR COMMON SIDE EFFECTS:  - Mild abdominal pain, nausea, belching, bloating or excessive gas:  rest,   eat lightly and use a heating pad.  - Sore Throat: treat with throat lozenges and/or gargle with warm salt   water.  - Because air was used during the procedure, expelling large amounts of air   from your rectum or belching is normal.  - If a bowel prep was taken, you may not have a bowel movement for 1-3 days.    This is normal.  SYMPTOMS TO WATCH FOR AND REPORT TO YOUR PHYSICIAN:  1. Abdominal pain or bloating, other than gas cramps.  2. Chest pain.  3. Back pain.  4. Signs of infection such as: chills or fever occurring within 24 hours   after the procedure.  5. Rectal bleeding, which would show as bright red, maroon, or black stools.   (A tablespoon of blood from the rectum is not serious, especially if   hemorrhoids are present.)  6. Vomiting.  7. Weakness or dizziness.  GO DIRECTLY TO THE NEAREST EMERGENCY ROOM IF YOU HAVE ANY OF THE FOLLOWING:      Difficulty breathing              Chills and/or fever over 101 F   Persistent vomiting and/or vomiting blood   Severe abdominal pain   Severe chest pain   Black, tarry stools   Bleeding- more than one  tablespoon   Any other symptom or condition that you feel may need urgent attention  Your doctor recommends these additional instructions:  If any biopsies were taken, your doctors clinic will contact you in 1 to 2   weeks with any results.  - [facility] to [facility] care.   - Patient has a contact number available for emergencies.  The signs and   symptoms of potential delayed complications were discussed with the   patient.  Return to normal activities tomorrow.  Written discharge   instructions were provided to the patient.   - Resume previous diet.   - Continue present medications.   - Repeat colonoscopy in 6 months for surveillance.   - Unclear if bleeding is from hemorrhoids which were actively bleeding vs   large (potentially malignant polyps) vs divertticular. Hold anticogulation   x 1 week and take ASA 81 mg daily. As long as no sign bleeding, ok to   resume anticoagulation in 1week. Post hemorrhoid band caresheet given in   recovery.  For questions, problems or results please call your physician - Fadi Langley III, MD at Work:  [phone].  [facility], EMERGENCY ROOM PHONE NUMBER: [phone]  IF A COMPLICATION OR EMERGENCY SITUATION ARISES AND YOU ARE UNABLE TO REACH   YOUR PHYSICIAN - GO DIRECTLY TO THE EMERGENCY ROOM.  Fadi Langley III, MD  12/17/2024 2:27:52 PM  This report has been verified and signed electronically.  Dear patient,  As a result of recent federal legislation (The Federal Cures Act), you may   receive lab or pathology results from your procedure in your [facility]   account before your physician is able to contact you. Your physician or   their representative will relay the results to you with their   recommendations at their soonest availability.  Thank you,  [employer]

## 2024-12-17 NOTE — NURSING
0353 12/17/24:fall precautions in place. pt tolerated prep and pedialyte this shift. Bloody stool was noted. NAD was noted further

## 2024-12-17 NOTE — PLAN OF CARE
[facility]  Initial Discharge Assessment       Primary Care Provider: No, Primary Doctor    Admission Diagnosis: Rectal bleeding [K62.5]    Admission Date: 12/16/2024  Expected Discharge Date: 12/18/2024    Transition of Care Barriers: None       met with Pt and pt's spouse at bedside to complete discharge assessment. Pt AAOx4s. Demographics, pharmacy, and insurance verified. No home health. No dialysis. Pt reports ability to complete ADLs without assistance. Pt verbalized plan to discharge home via personal transport. Pt has no PCP but interested in assistance setting up PCP.      Payor: [employer] / Plan: [facility] CHOICE PLUS / Product Type: Commercial /     Extended Emergency Contact Information  Primary Emergency Contact: bj pop   [facility]  Home Phone: [phone]  Mobile Phone: [phone]  Relation: Spouse   needed? No    Discharge Plan A: Home with family  Discharge Plan B: Home      [facility]  [address]  [facility] [address]  Phone: [phone] Fax: [phone]    [facility] - [place], [place] - [address]  [address] [address]  [facility] [address]  Phone: [phone] Fax: [phone]    [id number] [address]  [address] [address]  Phone: [phone] Fax: [phone]      Initial Assessment (most recent)       Adult Discharge Assessment - 12/17/24 1541          Discharge Assessment    Assessment Type Discharge Planning Assessment     Confirmed/corrected address, phone number and insurance Yes     Confirmed Demographics Correct on Facesheet     Source of Information patient;family     People in Home spouse;other relative(s)   pt, pt's spouse, pt's brother    Do you expect to return to your current living situation? Yes     Do you have help at home or someone to help you manage your care at home? Yes     Who  are your caregiver(s) and their phone number(s)? kieshabj (Spouse)  [phone]     Prior to hospitilization cognitive status: Unable to Assess     Current cognitive status: Alert/Oriented     Walking or Climbing Stairs Difficulty no     Dressing/Bathing Difficulty no     Readmission within 30 days? No     Patient currently being followed by outpatient case management? No     Do you currently have service(s) that help you manage your care at home? No     Do you take prescription medications? Yes     Do you have prescription coverage? Yes     Do you have any problems affording any of your prescribed medications? No     Is the patient taking medications as prescribed? yes     Who is going to help you get home at discharge? self     How do you get to doctors appointments? car, drives self     Are you on dialysis? No     Do you take coumadin? No   Eliquis    Discharge Plan A Home with family     Discharge Plan B Home     DME Needed Upon Discharge  none     Discharge Plan discussed with: Spouse/sig other;Patient     Name(s) and Number(s) bj pop (Spouse)  [phone]     Transition of Care Barriers None

## 2024-12-17 NOTE — PLAN OF CARE
SW attempted to meet pt at bedside to complete initial assessment; pt off floor; SW will return to complete assessment     12/17/24 1500   Discharge Assessment   Assessment Type Discharge Planning Assessment

## 2024-12-17 NOTE — PLAN OF CARE
reviewed chart and spoke with Attending MD. The patient will discharge home. Patient does not have PCP, internal medicine referral placed to establish PCP. Message sent to Discharge clinic to schedule a F/U appointment, CM requested that they call patient with the appointment time.    Patient is now cleared from a case management standpoint to DC home.       12/17/24 1640   Final Note   Assessment Type Final Discharge Note   Anticipated Discharge Disposition Home   Hospital Resources/Appts/Education Provided Appointment suggestion unavailable

## 2024-12-17 NOTE — ANESTHESIA PREPROCEDURE EVALUATION
12/17/2024  Riana Beltran is a 63 y.o., male.      Patient Active Problem List   Diagnosis    Traumatic closed nondisplaced fracture of head of radius, right, initial encounter    LV dysfunction    Shortness of breath    Paroxysmal atrial fibrillation    Essential hypertension    Systolic congestive heart failure    Nonspecific abnormal electrocardiogram (ECG) (EKG)    False positive stress test    Lower GI bleed    CHF (congestive heart failure)       Past Surgical History:   Procedure Laterality Date    ANKLE SURGERY Right     APPENDECTOMY      INSERTION, ICD, DUAL CHAMBER Left 12/13/2022    Procedure: Insertion, ICD, Dual Chamber;  Surgeon: Juan Dejesus MD;  Location: [facility] CATH/EP LAB;  Service: Cardiology;  Laterality: Left;    KNEE SURGERY Left     patella realignment    LATERAL EPICONDYLE RELEASE Right 03/11/2020    Procedure: RELEASE, ELBOW, LATERAL EPICONDYLE;  Surgeon: Saul Wynn MD;  Location: [facility] OR;  Service: Orthopedics;  Laterality: Right;    LEFT HEART CATHETERIZATION Left 09/14/2022    Procedure: Left heart cath;  Surgeon: Jamel Dejesus MD;  Location: [facility] CATH/EP LAB;  Service: Cardiology;  Laterality: Left;    NECK SURGERY      laminectomy    OPEN REDUCTION AND INTERNAL FIXATION (ORIF) OF FRACTURE OF RADIUS Right 03/11/2020    Procedure: ORIF, FRACTURE, RADIUS;  Surgeon: Saul Wynn MD;  Location: [facility] OR;  Service: Orthopedics;  Laterality: Right;  Synthes, needs compression screws BENJAMIN SANTIAGO NOTIFIED    OSTEOTOMY OF ZYGOMATIC BONE AND ORBIT      TONSILLECTOMY      VENOGRAM, CATH LAB  12/13/2022    Procedure: Venogram, Cath Lab;  Surgeon: Juan Dejesus MD;  Location: [facility] CATH/EP LAB;  Service: Cardiology;;        Tobacco Use:  The patient  reports that he quit smoking about 2 years ago. His smoking use included cigarettes. He started smoking about 12 years ago. He  has a 10 pack-year smoking history. He has never used smokeless tobacco.     Results for orders placed or performed during the hospital encounter of 12/13/22   EKG 12-lead    Collection Time: 12/13/22 12:11 PM    Narrative    Test Reason : Z95.810,    Vent. Rate : 061 BPM     Atrial Rate : 061 BPM     P-R Int : 080 ms          QRS Dur : 106 ms      QT Int : 416 ms       P-R-T Axes : 000 -09 041 degrees     QTc Int : 418 ms    Program found technically poor ECG  Sinus rhythm with short WV  Minimal voltage criteria for LVH, may be normal variant ( R in aVL )  Cannot rule out Anterior infarct ,age undetermined  Abnormal ECG  When compared with ECG of 08-DEC-2022 11:14,  Aberrant conduction is no longer Present  WV interval has decreased  Incomplete right bundle branch block is no longer Present  Minimal criteria for Anterior infarct are now Present  Confirmed by Juan Dejesus MD ([id number]) on 12/15/2022 2:50:33 PM    Referred By:             Confirmed By:Juan Dejesus MD              Lab Results   Component Value Date    WBC 8.47 12/17/2024    HGB 11.1 (L) 12/17/2024    HCT 32.2 (L) 12/17/2024    MCV 93 12/17/2024     12/17/2024     BMP  Lab Results   Component Value Date     12/16/2024    K 4.0 12/16/2024     12/16/2024    CO2 26 12/16/2024    BUN 13 12/16/2024    CREATININE 0.7 12/16/2024    CALCIUM 8.9 12/16/2024    ANIONGAP 8 12/16/2024     12/16/2024     (H) 12/16/2024     12/08/2022       Results for orders placed during the hospital encounter of 11/10/22    Echo    Interpretation Summary  · The left ventricle is mildly enlarged with concentric remodeling and moderately decreased systolic function.  · The estimated ejection fraction is 30%.  · Grade I left ventricular diastolic dysfunction.  · There is left ventricular global hypokinesis.  · Normal right ventricular size with normal right ventricular systolic function.  · Mild left atrial enlargement.  · Mild mitral  regurgitation.  · Mild tricuspid regurgitation.  · Normal central venous pressure (3 mmHg).  · The estimated PA systolic pressure is 24 mmHg.              Pre-op Assessment    I have reviewed the Patient Summary Reports.     I have reviewed the Nursing Notes. I have reviewed the NPO Status.   I have reviewed the Medications.     Review of Systems  Anesthesia Hx:  No problems with previous Anesthesia             Denies Family Hx of Anesthesia complications.    Denies Personal Hx of Anesthesia complications.                    Social:  Former Smoker       Hematology/Oncology:  Hematology Normal   Oncology Normal                Hematology Comments: Eliquis held for procedure                    EENT/Dental:  EENT/Dental Normal           Cardiovascular:  Exercise tolerance: good  Pacemaker ([employer] AICD) Hypertension, well controlled    Dysrhythmias (hx PAF)   CHF (decreased EF, 30% on most recent ECHO)  Denies Orthopnea.     Denies MART.   Patient is active and walks over 2 miles daily.  He can not recall any CHF exacerbation since 2022.                           Pulmonary:  Pulmonary Normal                       Renal/:  Renal/ Normal                 Hepatic/GI:  Hepatic/GI Normal       Melena and bloody diarrhea prior to admit  Hx of hemorrhoids               Musculoskeletal:  Musculoskeletal Normal                Neurological:  Neurology Normal                                      Endocrine:  Endocrine Normal            Psych:  Psychiatric Normal                    Physical Exam  General: Well nourished, Cooperative, Alert and Oriented    Airway:  Mallampati: III / II  Mouth Opening: Normal  TM Distance: Normal  Tongue: Normal  Neck ROM: Normal ROM    Dental:  Intact    Chest/Lungs:  Clear to auscultation, Normal Respiratory Rate    Heart:  Rate: Normal  Rhythm: Regular Rhythm  Sounds: Normal        Anesthesia Plan  Type of Anesthesia, risks & benefits discussed:    Anesthesia Type: Gen Natural Airway  Intra-op  Monitoring Plan: Standard ASA Monitors  Post Op Pain Control Plan:   (medical reason for not using multimodal pain management)  Induction:  IV  Informed Consent: Informed consent signed with the Patient and all parties understand the risks and agree with anesthesia plan.  All questions answered. Patient consented to blood products? No  ASA Score: 3    Ready For Surgery From Anesthesia Perspective.     .

## 2024-12-17 NOTE — PLAN OF CARE
Problem: Gastrointestinal Bleeding  Goal: Optimal Coping with Acute Illness  Outcome: Progressing  Goal: Hemostasis  Outcome: Progressing     Problem: Adult Inpatient Plan of Care  Goal: Plan of Care Review  Outcome: Progressing  Goal: Patient-Specific Goal (Individualized)  Outcome: Progressing  Goal: Absence of Hospital-Acquired Illness or Injury  Outcome: Progressing  Goal: Optimal Comfort and Wellbeing  Outcome: Progressing  Goal: Readiness for Transition of Care  Outcome: Progressing     Problem: Skin Injury Risk Increased  Goal: Skin Health and Integrity  Outcome: Progressing

## 2024-12-18 ENCOUNTER — PATIENT MESSAGE (OUTPATIENT)
Facility: CLINIC | Age: 64
End: 2024-12-18
Payer: COMMERCIAL

## 2024-12-19 NOTE — ANESTHESIA POSTPROCEDURE EVALUATION
Anesthesia Post Evaluation    Patient: Riana Beltran    Procedure(s) Performed: Procedure(s) (LRB):  COLONOSCOPY (N/A)    Final Anesthesia Type: general      Patient location during evaluation: GI PACU  Patient participation: Yes- Able to Participate  Level of consciousness: awake and alert  Post-procedure vital signs: reviewed and stable  Pain management: adequate  Airway patency: patent    PONV status at discharge: No PONV  Anesthetic complications: no      Cardiovascular status: stable  Respiratory status: unassisted  Hydration status: euvolemic  Follow-up not needed.              Vitals Value Taken Time   /97 12/17/24 1514   Temp 36.6 °C (97.8 °F) 12/17/24 1514   Pulse 68 12/17/24 1514   Resp 17 12/17/24 1514   SpO2 97 % 12/17/24 1514         Event Time   Out of Recovery 12/17/2024 15:00:00         Pain/Rocio Score: Pain Rating Prior to Med Admin: 7 (12/17/2024  2:50 PM)  Pain Rating Post Med Admin: 3 (12/17/2024  3:20 PM)  Rocio Score: 10 (12/17/2024  2:40 PM)

## 2025-01-07 NOTE — TELEPHONE ENCOUNTER
----- Message from Shira sent at 1/7/2025  8:42 AM CST -----  Contact: self  Type: Needs Medical Advice  Who Called:  pt  Pharmacy name and phone #:      [facility] - [place] - [address] [facility] [address]  [address] [address] [address]  Phone: [phone] Fax: [phone]       Best Call Back Number: [phone]   Additional Information: pt will be out of eliquis med today has made an appt for 1/28. Please call regarding the refill if you need to move the appt up

## 2025-01-08 ENCOUNTER — PATIENT MESSAGE (OUTPATIENT)
Dept: CARDIOLOGY | Facility: CLINIC | Age: 65
End: 2025-01-08
Payer: COMMERCIAL

## 2025-01-27 ENCOUNTER — HOSPITAL ENCOUNTER (OUTPATIENT)
Dept: CARDIOLOGY | Facility: CLINIC | Age: 65
Discharge: HOME | End: 2025-01-27
Attending: INTERNAL MEDICINE
Payer: COMMERCIAL

## 2025-01-27 ENCOUNTER — CLINICAL SUPPORT (OUTPATIENT)
Dept: CARDIOLOGY | Facility: CLINIC | Age: 65
End: 2025-01-27

## 2025-01-27 DIAGNOSIS — I49.8 OTHER SPECIFIED CARDIAC ARRHYTHMIAS: ICD-10-CM

## 2025-01-27 DIAGNOSIS — Z95.810 PRESENCE OF AUTOMATIC (IMPLANTABLE) CARDIAC DEFIBRILLATOR: ICD-10-CM

## 2025-01-27 PROCEDURE — 93296 REM INTERROG EVL PM/IDS: CPT | Mod: PN | Performed by: INTERNAL MEDICINE

## 2025-01-27 PROCEDURE — 93295 DEV INTERROG REMOTE 1/2/MLT: CPT | Mod: S$GLB,,, | Performed by: INTERNAL MEDICINE

## 2025-01-28 ENCOUNTER — OFFICE VISIT (OUTPATIENT)
Dept: CARDIOLOGY | Facility: CLINIC | Age: 65
End: 2025-01-28
Payer: COMMERCIAL

## 2025-01-28 VITALS
HEIGHT: 73 IN | SYSTOLIC BLOOD PRESSURE: 130 MMHG | DIASTOLIC BLOOD PRESSURE: 80 MMHG | HEART RATE: 70 BPM | WEIGHT: 230 LBS | RESPIRATION RATE: 17 BRPM | BODY MASS INDEX: 30.48 KG/M2

## 2025-01-28 DIAGNOSIS — M10.9 ACUTE GOUT OF LEFT FOOT, UNSPECIFIED CAUSE: ICD-10-CM

## 2025-01-28 DIAGNOSIS — I10 ESSENTIAL HYPERTENSION: ICD-10-CM

## 2025-01-28 DIAGNOSIS — K92.2 LOWER GI BLEED: ICD-10-CM

## 2025-01-28 DIAGNOSIS — I50.22 CHRONIC SYSTOLIC CONGESTIVE HEART FAILURE: Primary | ICD-10-CM

## 2025-01-28 DIAGNOSIS — I48.0 PAROXYSMAL ATRIAL FIBRILLATION: ICD-10-CM

## 2025-01-28 DIAGNOSIS — I51.9 LV DYSFUNCTION: ICD-10-CM

## 2025-01-28 PROCEDURE — 4010F ACE/ARB THERAPY RXD/TAKEN: CPT | Mod: CPTII,S$GLB,, | Performed by: INTERNAL MEDICINE

## 2025-01-28 PROCEDURE — 99999 PR PBB SHADOW E&M-EST. PATIENT-LVL III: CPT | Mod: PBBFAC,,, | Performed by: INTERNAL MEDICINE

## 2025-01-28 PROCEDURE — 3075F SYST BP GE 130 - 139MM HG: CPT | Mod: CPTII,S$GLB,, | Performed by: INTERNAL MEDICINE

## 2025-01-28 PROCEDURE — 3008F BODY MASS INDEX DOCD: CPT | Mod: CPTII,S$GLB,, | Performed by: INTERNAL MEDICINE

## 2025-01-28 PROCEDURE — 3079F DIAST BP 80-89 MM HG: CPT | Mod: CPTII,S$GLB,, | Performed by: INTERNAL MEDICINE

## 2025-01-28 PROCEDURE — 1160F RVW MEDS BY RX/DR IN RCRD: CPT | Mod: CPTII,S$GLB,, | Performed by: INTERNAL MEDICINE

## 2025-01-28 PROCEDURE — 99215 OFFICE O/P EST HI 40 MIN: CPT | Mod: S$GLB,,, | Performed by: INTERNAL MEDICINE

## 2025-01-28 PROCEDURE — 1159F MED LIST DOCD IN RCRD: CPT | Mod: CPTII,S$GLB,, | Performed by: INTERNAL MEDICINE

## 2025-01-28 RX ORDER — ALLOPURINOL 100 MG/1
100 TABLET ORAL DAILY
Qty: 30 TABLET | Refills: 11 | Status: SHIPPED | OUTPATIENT
Start: 2025-01-28 | End: 2026-01-28

## 2025-01-28 RX ORDER — SPIRONOLACTONE 25 MG/1
25 TABLET ORAL DAILY
Qty: 90 TABLET | Refills: 3 | Status: SHIPPED | OUTPATIENT
Start: 2025-01-28 | End: 2026-01-28

## 2025-01-28 RX ORDER — SACUBITRIL AND VALSARTAN 24; 26 MG/1; MG/1
1 TABLET, FILM COATED ORAL 2 TIMES DAILY
Qty: 180 TABLET | Refills: 3 | Status: SHIPPED | OUTPATIENT
Start: 2025-01-28

## 2025-01-28 RX ORDER — FUROSEMIDE 20 MG/1
20 TABLET ORAL EVERY OTHER DAY
Qty: 45 TABLET | Refills: 3 | Status: SHIPPED | OUTPATIENT
Start: 2025-01-28 | End: 2026-01-28

## 2025-01-28 RX ORDER — CARVEDILOL 6.25 MG/1
6.25 TABLET ORAL 2 TIMES DAILY
Qty: 180 TABLET | Refills: 3 | Status: SHIPPED | OUTPATIENT
Start: 2025-01-28

## 2025-01-28 RX ORDER — COLCHICINE 0.6 MG/1
0.6 TABLET ORAL DAILY PRN
Qty: 30 TABLET | Refills: 3 | Status: SHIPPED | OUTPATIENT
Start: 2025-01-28 | End: 2026-01-28

## 2025-01-28 NOTE — PROGRESS NOTES
Subjective:    Patient ID:  Riana Beltran is a 64 y.o. male patient here for evaluation Follow-up (Annual F/U pt doing well) and Medication Refill      History of Present Illness:     Patient is a 64-year-old gentleman with history of compensated congestive heart failure arterial hypertension had developed GI bleed was admitted to the hospital on 12/16/2024 noted to have liver GI bleed associated with some polyp while he was on oral anticoagulation therapy.  He was taken off anticoagulation for about a week and police were cauterized and subsequently he has symptoms have improved.  In his addition to this has developed acute gout in his left foot was seen at local urgent care treated with colchicine has some relief with it.  Patient denies having chest discomfort no arm neck or jaw pain noted no swelling in the lower extremities no shortness of breath and no discharges from the ICD.        Review of patient's allergies indicates:  No Known Allergies    Past Medical History:   Diagnosis Date    A-fib     Back pain 2020    Bilateral radial fractures 2020    Broken ribs 02/29/2020    Celiac disease     HF (heart failure)     HTN (hypertension)     Neck pain 2020     Past Surgical History:   Procedure Laterality Date    ANKLE SURGERY Right     APPENDECTOMY      COLONOSCOPY N/A 12/17/2024    Procedure: COLONOSCOPY;  Surgeon: Fadi Langley III, MD;  Location: [facility] ENDO;  Service: Endoscopy;  Laterality: N/A;    INSERTION, ICD, DUAL CHAMBER Left 12/13/2022    Procedure: Insertion, ICD, Dual Chamber;  Surgeon: Juan Dejesus MD;  Location: [facility] CATH/EP LAB;  Service: Cardiology;  Laterality: Left;    KNEE SURGERY Left     patella realignment    LATERAL EPICONDYLE RELEASE Right 03/11/2020    Procedure: RELEASE, ELBOW, LATERAL EPICONDYLE;  Surgeon: Saul Wynn MD;  Location: [facility] OR;  Service: Orthopedics;  Laterality: Right;    LEFT HEART CATHETERIZATION Left 09/14/2022    Procedure: Left heart cath;   "Surgeon: Jamel Dejesus MD;  Location: [facility] CATH/EP LAB;  Service: Cardiology;  Laterality: Left;    NECK SURGERY      laminectomy    OPEN REDUCTION AND INTERNAL FIXATION (ORIF) OF FRACTURE OF RADIUS Right 2020    Procedure: ORIF, FRACTURE, RADIUS;  Surgeon: Saul Wynn MD;  Location: [facility] OR;  Service: Orthopedics;  Laterality: Right;  Synthes, needs compression screws BENJAMIN SANTIAGO NOTIFIED    OSTEOTOMY OF ZYGOMATIC BONE AND ORBIT      TONSILLECTOMY      VENOGRAM, CATH LAB  2022    Procedure: Venogram, Cath Lab;  Surgeon: Juan Dejesus MD;  Location: [facility] CATH/EP LAB;  Service: Cardiology;;     Social History     Tobacco Use    Smoking status: Former     Current packs/day: 0.00     Average packs/day: 1 pack/day for 10.0 years (10.0 ttl pk-yrs)     Types: Cigarettes     Start date: 2012     Quit date: 2022     Years since quittin.4    Smokeless tobacco: Never   Substance Use Topics    Alcohol use: Not Currently    Drug use: Never        Review of Systems:    As noted in HPI in addition      REVIEW OF SYSTEMS  CARDIOVASCULAR: No recent chest pain, palpitations, arm, neck, or jaw pain  RESPIRATORY: No recent fever, cough chills, SOB or congestion  : No blood in the urine  GI: No Nausea, vomiting, constipation, diarrhea, blood, or reflux.  MUSCULOSKELETAL: No myalgias  NEURO: No lightheadedness or dizziness  EYES: No Double vision, blurry, vision or headache              Objective        Vitals:    25 0834   BP: 130/80   Pulse: 70   Resp: 17       LIPIDS - LAST 2   No results found for: "CHOL", "HDL", "LDLCALC", "TRIG", "CHOLHDL"    CBC - LAST 2  Lab Results   Component Value Date    WBC 7.12 2024    WBC 8.47 2024    RBC 3.58 (L) 2024    RBC 3.45 (L) 2024    HGB 11.1 (L) 2024    HGB 11.1 (L) 2024    HCT 33.0 (L) 2024    HCT 32.2 (L) 2024    MCV 92 2024    MCV 93 2024    MCH 31.0 2024    MCH 32.2 (H) 2024    " "MCHC 33.6 12/17/2024    MCHC 34.5 12/17/2024    RDW 12.0 12/17/2024    RDW 12.2 12/17/2024     12/17/2024     12/17/2024    MPV 10.1 12/17/2024    MPV 10.1 12/17/2024    GRAN 4.1 12/17/2024    GRAN 57.0 12/17/2024    LYMPH 2.0 12/17/2024    LYMPH 28.2 12/17/2024    MONO 0.7 12/17/2024    MONO 9.7 12/17/2024    BASO 0.04 12/17/2024    BASO 0.06 12/17/2024    NRBC 0 12/17/2024    NRBC 0 12/17/2024       CHEMISTRY & LIVER FUNCTION - LAST 2  Lab Results   Component Value Date     12/16/2024     (L) 12/16/2024    K 4.0 12/16/2024    K 4.1 12/16/2024     12/16/2024     12/16/2024    CO2 26 12/16/2024    CO2 26 12/16/2024    ANIONGAP 8 12/16/2024    ANIONGAP 7 (L) 12/16/2024    BUN 13 12/16/2024    BUN 14 12/16/2024    CREATININE 0.7 12/16/2024    CREATININE 0.7 12/16/2024     12/16/2024     (H) 12/16/2024    CALCIUM 8.9 12/16/2024    CALCIUM 9.5 12/16/2024    MG 2.2 12/08/2022    ALBUMIN 4.3 12/16/2024    ALBUMIN 4.6 12/16/2024    PROT 6.8 12/16/2024    PROT 7.2 12/16/2024    ALKPHOS 66 12/16/2024    ALKPHOS 73 12/16/2024    ALT 16 12/16/2024    ALT 16 12/16/2024    AST 15 12/16/2024    AST 16 12/16/2024    BILITOT 0.7 12/16/2024    BILITOT 0.7 12/16/2024        CARDIAC PROFILE - LAST 2  No results found for: "BNP", "CPK", "CPKMB", "LDH", "TROPONINI", "TROPONINIHS"     COAGULATION - LAST 2  Lab Results   Component Value Date    LABPT 13.5 12/08/2022    INR 1.1 12/08/2022    APTT 34.9 12/08/2022    APTT 34.8 09/12/2022       ENDOCRINE & PSA - LAST 2  No results found for: "HGBA1C", "MICROALBUR", "TSH", "PROCAL", "PSA"     ECHOCARDIOGRAM RESULTS  Results for orders placed during the hospital encounter of 11/10/22    Echo    Interpretation Summary  · The left ventricle is mildly enlarged with concentric remodeling and moderately decreased systolic function.  · The estimated ejection fraction is 30%.  · Grade I left ventricular diastolic dysfunction.  · There is left " ventricular global hypokinesis.  · Normal right ventricular size with normal right ventricular systolic function.  · Mild left atrial enlargement.  · Mild mitral regurgitation.  · Mild tricuspid regurgitation.  · Normal central venous pressure (3 mmHg).  · The estimated PA systolic pressure is 24 mmHg.      CURRENT/PREVIOUS VISIT EKG  Results for orders placed or performed during the hospital encounter of 12/16/24   EKG 12-lead    Collection Time: 12/16/24 12:00 AM    Narrative    Ordered by an unspecified provider.     No valid procedures specified.   Results for orders placed during the hospital encounter of 08/29/22    Nuclear Stress - Cardiology Interpreted    Interpretation Summary    Abnormal myocardial perfusion scan.    There is a moderate intensity, small to moderate sized, reversible perfusion abnormality that is consistent with ischemia in the inferolateral wall(s).    There are no other significant perfusion abnormalities.    The gated perfusion images showed an ejection fraction of 25% post stress. Normal ejection fraction is greater than 47%.    There is  and severe global hypokinesis at stress .    LV cavity size is  and moderately enlarged at stress.    The EKG portion of this study is negative for ischemia.    The patient reported no chest pain during the stress test.    During stress, occasional PVCs are noted.    No valid procedures specified.    PHYSICAL EXAM  CONSTITUTIONAL: Well built, well nourished in no apparent distress  NECK: no carotid bruit, no JVD  LUNGS: CTA  CHEST WALL: no tenderness  HEART: regular rate and rhythm, S1, S2 normal, no murmur, click, rub or gallop   ABDOMEN: soft, non-tender; bowel sounds normal; no masses,  no organomegaly  EXTREMITIES: Extremities normal, no edema, no calf tenderness noted  NEURO: AAO X 3    I HAVE REVIEWED :    The vital signs, nurses notes, and all the pertinent radiology and labs.        Current Outpatient Medications   Medication Instructions     "allopurinoL (ZYLOPRIM) 100 mg, Oral, Daily    apixaban (ELIQUIS) 5 mg, Oral, 2 times daily    carvediloL (COREG) 6.25 mg, Oral, 2 times daily    colchicine (COLCRYS) 0.6 mg, Oral, Daily PRN, 1 tablet a day, may take b.i.d. the 1st day    furosemide (LASIX) 20 mg, Oral, Every other day    sacubitriL-valsartan (ENTRESTO) 24-26 mg per tablet 1 tablet, Oral, 2 times daily    spironolactone (ALDACTONE) 25 mg, Oral, Daily      01/28/2025 EKG shows normal sinus rhythm inferior infarct pattern nonspecific ST T wave changes    Assessment & Plan     1. Chronic systolic congestive heart failure  Assessment & Plan:  Patient has Systolic (HFrEF) heart failure that is Chronic. On presentation their CHF was well compensated. Most recent BNP and echo results are listed below.  No results for input(s): "BNP" in the last 72 hours.  Latest ECHO  Results for orders placed during the hospital encounter of 11/10/22    Echo    Interpretation Summary  · The left ventricle is mildly enlarged with concentric remodeling and moderately decreased systolic function.  · The estimated ejection fraction is 30%.  · Grade I left ventricular diastolic dysfunction.  · There is left ventricular global hypokinesis.  · Normal right ventricular size with normal right ventricular systolic function.  · Mild left atrial enlargement.  · Mild mitral regurgitation.  · Mild tricuspid regurgitation.  · Normal central venous pressure (3 mmHg).  · The estimated PA systolic pressure is 24 mmHg.    Current Heart Failure Medications       Plan  - Monitor strict I&Os and daily weights.    - Place on telemetry  - Low sodium diet  - Place on fluid restriction of 1.5 L.   - Cardiology has been consulted  - The patient's volume status is at their baseline  - arm he is very well compensated on the present therapy to include Entresto 24/26 mg p.o. b.i.d. Lasix 20 mg a day and Aldactone.  Continue on Coreg 6.25 mg p.o. b.i.d. as well        Orders:  -     IN OFFICE EKG 12-LEAD " (to Muse)    2. Acute gout of left foot, unspecified cause  Assessment & Plan:  Colchicine on p.r.n. basis maintain on allopurinol 100 mg daily    Orders:  -     colchicine (COLCRYS) 0.6 mg tablet; Take 1 tablet (0.6 mg total) by mouth daily as needed (Acute gout pain). 1 tablet a day, may take b.i.d. the 1st day  Dispense: 30 tablet; Refill: 3  -     IN OFFICE EKG 12-LEAD (to Muse)    3. Essential hypertension  Assessment & Plan:  Blood pressure is stable on the present regimen continue on Coreg Entresto Lasix and Aldactone combination      4. LV dysfunction  Assessment & Plan:  EF about 30% previous evaluations and functioning ICD in Situ and compensated heart failure management as discussed above    Orders:  -     CBC Without Differential; Future; Expected date: 01/28/2025  -     Basic Metabolic Panel; Future; Expected date: 01/28/2025    5. Paroxysmal atrial fibrillation  Assessment & Plan:  Patient has been on oral anticoagulation therapy with high chads score however he has developed acute GI bleed more recently in December however in the past few weeks has been doing well he is back on Eliquis at 5 mg p.o. b.i.d..  Maintain the same regimen monitor his hemoglobin hematocrit and watch for any source of GI bleed.  Encouraged GI follow-up      6. Lower GI bleed  Assessment & Plan:  Seems to be controlled at this time management per GI ice pack on Eliquis    Orders:  -     CBC Without Differential; Future; Expected date: 01/28/2025  -     Basic Metabolic Panel; Future; Expected date: 01/28/2025    Other orders  -     apixaban (ELIQUIS) 5 mg Tab; Take 1 tablet (5 mg total) by mouth 2 (two) times daily.  Dispense: 60 tablet; Refill: 0  -     carvediloL (COREG) 6.25 MG tablet; Take 1 tablet (6.25 mg total) by mouth 2 (two) times daily.  Dispense: 180 tablet; Refill: 3  -     allopurinoL (ZYLOPRIM) 100 MG tablet; Take 1 tablet (100 mg total) by mouth once daily.  Dispense: 30 tablet; Refill: 11  -     spironolactone  (ALDACTONE) 25 MG tablet; Take 1 tablet (25 mg total) by mouth once daily.  Dispense: 90 tablet; Refill: 3  -     sacubitriL-valsartan (ENTRESTO) 24-26 mg per tablet; Take 1 tablet by mouth 2 (two) times daily.  Dispense: 180 tablet; Refill: 3  -     furosemide (LASIX) 20 MG tablet; Take 1 tablet (20 mg total) by mouth every other day.  Dispense: 45 tablet; Refill: 3          Follow up in about 6 months (around 7/28/2025).

## 2025-01-28 NOTE — ASSESSMENT & PLAN NOTE
Patient has been on oral anticoagulation therapy with high chads score however he has developed acute GI bleed more recently in December however in the past few weeks has been doing well he is back on Eliquis at 5 mg p.o. b.i.d..  Maintain the same regimen monitor his hemoglobin hematocrit and watch for any source of GI bleed.  Encouraged GI follow-up

## 2025-01-28 NOTE — ASSESSMENT & PLAN NOTE
"Patient has Systolic (HFrEF) heart failure that is Chronic. On presentation their CHF was well compensated. Most recent BNP and echo results are listed below.  No results for input(s): "BNP" in the last 72 hours.  Latest ECHO  Results for orders placed during the hospital encounter of 11/10/22    Echo    Interpretation Summary  · The left ventricle is mildly enlarged with concentric remodeling and moderately decreased systolic function.  · The estimated ejection fraction is 30%.  · Grade I left ventricular diastolic dysfunction.  · There is left ventricular global hypokinesis.  · Normal right ventricular size with normal right ventricular systolic function.  · Mild left atrial enlargement.  · Mild mitral regurgitation.  · Mild tricuspid regurgitation.  · Normal central venous pressure (3 mmHg).  · The estimated PA systolic pressure is 24 mmHg.    Current Heart Failure Medications       Plan  - Monitor strict I&Os and daily weights.    - Place on telemetry  - Low sodium diet  - Place on fluid restriction of 1.5 L.   - Cardiology has been consulted  - The patient's volume status is at their baseline  - arm he is very well compensated on the present therapy to include Entresto 24/26 mg p.o. b.i.d. Lasix 20 mg a day and Aldactone.  Continue on Coreg 6.25 mg p.o. b.i.d. as well      "

## 2025-01-28 NOTE — ASSESSMENT & PLAN NOTE
Blood pressure is stable on the present regimen continue on Coreg Entresto Lasix and Aldactone combination   No

## 2025-01-28 NOTE — ASSESSMENT & PLAN NOTE
EF about 30% previous evaluations and functioning ICD in Situ and compensated heart failure management as discussed above

## 2025-03-20 RX ORDER — APIXABAN 5 MG/1
5 TABLET, FILM COATED ORAL 2 TIMES DAILY
Qty: 60 TABLET | Refills: 0 | Status: SHIPPED | OUTPATIENT
Start: 2025-03-20

## 2025-04-21 ENCOUNTER — PATIENT MESSAGE (OUTPATIENT)
Dept: CARDIOLOGY | Facility: CLINIC | Age: 65
End: 2025-04-21
Payer: COMMERCIAL

## 2025-04-21 RX ORDER — APIXABAN 5 MG/1
5 TABLET, FILM COATED ORAL 2 TIMES DAILY
Qty: 60 TABLET | Refills: 1 | OUTPATIENT
Start: 2025-04-21

## 2025-04-21 NOTE — TELEPHONE ENCOUNTER
----- Message from Niles sent at 4/21/2025  1:26 PM CDT -----  Regarding: refill  Type:  RX Refill RequestWho Called: ptRefill or New Rx:refillRX Name and Strength:ELIQUIS 5 mg Tab 60 tablet 0 3/20/2025 - Sig - Route: TAKE 1 TABLET (5 MG TOTAL) BY MOUTH 2 (TWO) TIMES DAILY. - Oral Sent to pharmacy as: ELIQUIS 5 mg Tab E-Prescribing Status: Receipt confirmed by pharmacy (3/20/2025 11:50 AM CDT) How is the patient currently taking it? (ex. 1XDay):see aboveIs this a 30 day or 90 day RX:see abovePreferred Pharmacy with phone number:[facility] [id number] [place] [address]Fvsep: [phone] Fax: [phone] or Mail Order:local Ordering Provider:mango Pina Call Back Number:[phone] Information: pt is out of meds.  Please call to discuss.

## 2025-04-28 ENCOUNTER — HOSPITAL ENCOUNTER (OUTPATIENT)
Dept: CARDIOLOGY | Facility: CLINIC | Age: 65
Discharge: HOME | End: 2025-04-28
Attending: INTERNAL MEDICINE
Payer: COMMERCIAL

## 2025-04-28 ENCOUNTER — CLINICAL SUPPORT (OUTPATIENT)
Dept: CARDIOLOGY | Facility: CLINIC | Age: 65
End: 2025-04-28

## 2025-04-28 DIAGNOSIS — I49.8 OTHER SPECIFIED CARDIAC ARRHYTHMIAS: ICD-10-CM

## 2025-04-28 DIAGNOSIS — Z95.810 PRESENCE OF AUTOMATIC (IMPLANTABLE) CARDIAC DEFIBRILLATOR: ICD-10-CM

## 2025-04-28 PROCEDURE — 93296 REM INTERROG EVL PM/IDS: CPT | Mod: PN | Performed by: INTERNAL MEDICINE

## 2025-04-28 PROCEDURE — 93295 DEV INTERROG REMOTE 1/2/MLT: CPT | Mod: S$GLB,,, | Performed by: INTERNAL MEDICINE

## 2025-07-11 ENCOUNTER — HOSPITAL ENCOUNTER (OUTPATIENT)
Dept: CARDIOLOGY | Facility: CLINIC | Age: 65
Discharge: HOME | End: 2025-07-11
Attending: INTERNAL MEDICINE
Payer: COMMERCIAL

## 2025-07-11 ENCOUNTER — CLINICAL SUPPORT (OUTPATIENT)
Dept: CARDIOLOGY | Facility: CLINIC | Age: 65
End: 2025-07-11
Payer: COMMERCIAL

## 2025-07-11 DIAGNOSIS — I49.8 OTHER SPECIFIED CARDIAC ARRHYTHMIAS: ICD-10-CM

## 2025-07-11 DIAGNOSIS — Z95.810 PRESENCE OF AUTOMATIC (IMPLANTABLE) CARDIAC DEFIBRILLATOR: ICD-10-CM

## 2025-07-28 ENCOUNTER — CLINICAL SUPPORT (OUTPATIENT)
Dept: CARDIOLOGY | Facility: CLINIC | Age: 65
End: 2025-07-28

## 2025-07-28 ENCOUNTER — HOSPITAL ENCOUNTER (OUTPATIENT)
Dept: CARDIOLOGY | Facility: CLINIC | Age: 65
Discharge: HOME | End: 2025-07-28
Attending: INTERNAL MEDICINE
Payer: COMMERCIAL

## 2025-07-28 DIAGNOSIS — I49.8 OTHER SPECIFIED CARDIAC ARRHYTHMIAS: ICD-10-CM

## 2025-07-28 DIAGNOSIS — Z95.810 PRESENCE OF AUTOMATIC (IMPLANTABLE) CARDIAC DEFIBRILLATOR: ICD-10-CM

## 2025-07-28 PROCEDURE — 93295 DEV INTERROG REMOTE 1/2/MLT: CPT | Mod: S$GLB,,, | Performed by: INTERNAL MEDICINE

## 2025-07-28 PROCEDURE — 93296 REM INTERROG EVL PM/IDS: CPT | Mod: PN | Performed by: INTERNAL MEDICINE

## 2025-08-01 LAB
OHS CV AF BURDEN PERCENT: < 1
OHS CV DC REMOTE DEVICE TYPE: NORMAL
OHS CV ICD SHOCK: NO
OHS CV RV PACING PERCENT: 0.03 %
OHS CV RV PACING PERCENT: 0.05 %
OHS CV RV PACING PERCENT: 0.08 %

## 2025-08-06 LAB
OHS CV AF BURDEN PERCENT: < 1
OHS CV DC REMOTE DEVICE TYPE: NORMAL
OHS CV RV PACING PERCENT: 0.01 %

## (undated) DEVICE — GLOVE BIOGEL PI GOLD SZ  8.5

## (undated) DEVICE — DRAPE CLEAR SMALL 1000

## (undated) DEVICE — SPONGE GAUZE 10S 4X4  442214

## (undated) DEVICE — CATHETER RADIAL TIG 4.0 OPTITORQUE 5X110

## (undated) DEVICE — DRILL BIT

## (undated) DEVICE — SHEATH INTRODUCER SLENDER 6FX10CM

## (undated) DEVICE — CUFF TOURNIQUET 18DUAL PRT 5921-218-235

## (undated) DEVICE — ADHESIVE MASTISOL VIAL 0523-48

## (undated) DEVICE — Device

## (undated) DEVICE — DRAPE C-ARM (FITS NEW C-ARM) 07-CA108

## (undated) DEVICE — SUTURE VICRYL 2-0 CT-1   J739D

## (undated) DEVICE — PADDING CAST 4 STERILE 30-321

## (undated) DEVICE — SUTURE VICRYL 3-0 SH 27 VCP416H

## (undated) DEVICE — UNDERGLOVE BIOGEL MICRO BLUE SZ 8.5

## (undated) DEVICE — STOCKINETTE IMPERVIOUS 12X48 STK6248

## (undated) DEVICE — SUTURE MONOCRYL 4-0 PS-2 27 MCP426H

## (undated) DEVICE — COVER MAYO STAND 89601

## (undated) DEVICE — SOLUTION IRRI NS BOTTLE 1000ML R5200-01

## (undated) DEVICE — BANDAGE ACE STERILE 4 REB3114

## (undated) DEVICE — PREP CHLORA 26ML

## (undated) DEVICE — GOWN SMART LRG 044673

## (undated) DEVICE — SUTURE MONOCRYL 3-0 27 PS-1 MCP936H

## (undated) DEVICE — STERISTRIP 1/2 R1547

## (undated) DEVICE — BANDAGE COBAN 4 TAN

## (undated) DEVICE — SLING ULTRA LARGE 11-0138-4

## (undated) DEVICE — CATHETER DIAGNOSTIC DXTERITY 5FR JR4.0

## (undated) DEVICE — BANDAGE ESMARK 4X9 55514

## (undated) DEVICE — PAD BOVIE ADULT

## (undated) DEVICE — TRAY UPPER EXTREMITY

## (undated) DEVICE — DRESSING AQUACEL AG W/SILVER 3.5X6